# Patient Record
Sex: FEMALE | Race: WHITE | NOT HISPANIC OR LATINO
[De-identification: names, ages, dates, MRNs, and addresses within clinical notes are randomized per-mention and may not be internally consistent; named-entity substitution may affect disease eponyms.]

---

## 2018-06-23 ENCOUNTER — TRANSCRIPTION ENCOUNTER (OUTPATIENT)
Age: 63
End: 2018-06-23

## 2018-06-29 ENCOUNTER — TRANSCRIPTION ENCOUNTER (OUTPATIENT)
Age: 63
End: 2018-06-29

## 2018-07-03 ENCOUNTER — RX RENEWAL (OUTPATIENT)
Age: 63
End: 2018-07-03

## 2018-07-05 ENCOUNTER — RX RENEWAL (OUTPATIENT)
Age: 63
End: 2018-07-05

## 2018-07-05 DIAGNOSIS — Z86.39 PERSONAL HISTORY OF OTHER ENDOCRINE, NUTRITIONAL AND METABOLIC DISEASE: ICD-10-CM

## 2018-07-06 ENCOUNTER — RECORD ABSTRACTING (OUTPATIENT)
Age: 63
End: 2018-07-06

## 2018-07-06 ENCOUNTER — APPOINTMENT (OUTPATIENT)
Dept: INTERNAL MEDICINE | Facility: CLINIC | Age: 63
End: 2018-07-06
Payer: COMMERCIAL

## 2018-07-06 VITALS
SYSTOLIC BLOOD PRESSURE: 106 MMHG | OXYGEN SATURATION: 99 % | TEMPERATURE: 97.7 F | HEART RATE: 80 BPM | DIASTOLIC BLOOD PRESSURE: 70 MMHG

## 2018-07-06 VITALS — WEIGHT: 176 LBS | BODY MASS INDEX: 29.32 KG/M2 | HEIGHT: 65 IN

## 2018-07-06 DIAGNOSIS — Z83.3 FAMILY HISTORY OF DIABETES MELLITUS: ICD-10-CM

## 2018-07-06 DIAGNOSIS — S83.209A UNSPECIFIED TEAR OF UNSPECIFIED MENISCUS, CURRENT INJURY, UNSPECIFIED KNEE, INITIAL ENCOUNTER: ICD-10-CM

## 2018-07-06 DIAGNOSIS — K57.90 DIVERTICULOSIS OF INTESTINE, PART UNSPECIFIED, W/OUT PERFORATION OR ABSCESS W/OUT BLEEDING: ICD-10-CM

## 2018-07-06 DIAGNOSIS — Z82.49 FAMILY HISTORY OF ISCHEMIC HEART DISEASE AND OTHER DISEASES OF THE CIRCULATORY SYSTEM: ICD-10-CM

## 2018-07-06 DIAGNOSIS — Z78.9 OTHER SPECIFIED HEALTH STATUS: ICD-10-CM

## 2018-07-06 DIAGNOSIS — Z87.42 PERSONAL HISTORY OF OTHER DISEASES OF THE FEMALE GENITAL TRACT: ICD-10-CM

## 2018-07-06 DIAGNOSIS — R92.2 INCONCLUSIVE MAMMOGRAM: ICD-10-CM

## 2018-07-06 DIAGNOSIS — Z83.49 FAMILY HISTORY OF OTHER ENDOCRINE, NUTRITIONAL AND METABOLIC DISEASES: ICD-10-CM

## 2018-07-06 PROCEDURE — 99214 OFFICE O/P EST MOD 30 MIN: CPT

## 2018-07-06 RX ORDER — ADHESIVE TAPE 3"X 2.3 YD
50 MCG TAPE, NON-MEDICATED TOPICAL DAILY
Refills: 0 | Status: ACTIVE | COMMUNITY
Start: 2018-07-06

## 2018-07-06 RX ORDER — AMOXICILLIN 875 MG/1
875 TABLET, FILM COATED ORAL
Qty: 20 | Refills: 0 | Status: COMPLETED | COMMUNITY
Start: 2018-06-23

## 2018-07-06 NOTE — PHYSICAL EXAM
[No Acute Distress] : no acute distress [Well Nourished] : well nourished [Normal Sclera/Conjunctiva] : normal sclera/conjunctiva [PERRL] : pupils equal round and reactive to light [EOMI] : extraocular movements intact [Normal Outer Ear/Nose] : the outer ears and nose were normal in appearance [Normal Oropharynx] : the oropharynx was normal [Normal TMs] : both tympanic membranes were normal [No JVD] : no jugular venous distention [Supple] : supple [No Lymphadenopathy] : no lymphadenopathy [No Respiratory Distress] : no respiratory distress  [Clear to Auscultation] : lungs were clear to auscultation bilaterally [No Accessory Muscle Use] : no accessory muscle use [Normal Rate] : normal rate  [Regular Rhythm] : with a regular rhythm [Normal S1, S2] : normal S1 and S2 [Normal Posterior Cervical Nodes] : no posterior cervical lymphadenopathy [Normal Anterior Cervical Nodes] : no anterior cervical lymphadenopathy

## 2018-07-06 NOTE — PLAN
[FreeTextEntry1] : Patient will start medication given above and follow up with me in 5 days if not feeling better or before that if worsens.\par She is advised to increase fluids avoid dairy products and rest

## 2018-07-06 NOTE — REVIEW OF SYSTEMS
[Shortness Of Breath] : no shortness of breath [Wheezing] : no wheezing [Cough] : cough [Dyspnea on Exertion] : no dyspnea on exertion [Negative] : Heme/Lymph

## 2018-07-18 ENCOUNTER — RX RENEWAL (OUTPATIENT)
Age: 63
End: 2018-07-18

## 2018-08-03 ENCOUNTER — RX CHANGE (OUTPATIENT)
Age: 63
End: 2018-08-03

## 2018-08-06 ENCOUNTER — OTHER (OUTPATIENT)
Age: 63
End: 2018-08-06

## 2018-09-04 ENCOUNTER — RX RENEWAL (OUTPATIENT)
Age: 63
End: 2018-09-04

## 2018-09-04 RX ORDER — PHENTERMINE HYDROCHLORIDE 37.5 MG/1
37.5 CAPSULE ORAL DAILY
Qty: 30 | Refills: 0 | Status: COMPLETED | COMMUNITY
Start: 2018-07-03 | End: 2018-09-04

## 2018-10-02 ENCOUNTER — RX RENEWAL (OUTPATIENT)
Age: 63
End: 2018-10-02

## 2018-10-26 ENCOUNTER — RX RENEWAL (OUTPATIENT)
Age: 63
End: 2018-10-26

## 2018-10-29 ENCOUNTER — RX RENEWAL (OUTPATIENT)
Age: 63
End: 2018-10-29

## 2018-10-30 ENCOUNTER — RX RENEWAL (OUTPATIENT)
Age: 63
End: 2018-10-30

## 2018-11-07 ENCOUNTER — RECORD ABSTRACTING (OUTPATIENT)
Age: 63
End: 2018-11-07

## 2019-01-04 ENCOUNTER — RX RENEWAL (OUTPATIENT)
Age: 64
End: 2019-01-04

## 2019-01-24 ENCOUNTER — RX RENEWAL (OUTPATIENT)
Age: 64
End: 2019-01-24

## 2019-01-24 DIAGNOSIS — Z12.31 ENCOUNTER FOR SCREENING MAMMOGRAM FOR MALIGNANT NEOPLASM OF BREAST: ICD-10-CM

## 2019-01-25 PROBLEM — Z12.31 SCREENING FOR BREAST CANCER: Status: ACTIVE | Noted: 2019-01-25

## 2019-02-03 NOTE — HISTORY OF PRESENT ILLNESS
Statement Selected [FreeTextEntry8] : Pt c/o bad cough, and phlegm x 3 weeks. Pt went to UC 2x and was given steroid pack however meds are not helping.

## 2019-02-12 ENCOUNTER — RX RENEWAL (OUTPATIENT)
Age: 64
End: 2019-02-12

## 2019-02-25 ENCOUNTER — APPOINTMENT (OUTPATIENT)
Dept: INTERNAL MEDICINE | Facility: CLINIC | Age: 64
End: 2019-02-25
Payer: COMMERCIAL

## 2019-02-25 VITALS
BODY MASS INDEX: 30.16 KG/M2 | SYSTOLIC BLOOD PRESSURE: 120 MMHG | WEIGHT: 181 LBS | DIASTOLIC BLOOD PRESSURE: 70 MMHG | HEIGHT: 65 IN | TEMPERATURE: 97.7 F

## 2019-02-25 PROCEDURE — 99214 OFFICE O/P EST MOD 30 MIN: CPT

## 2019-02-25 RX ORDER — GUAIFENESIN AND DEXTROMETHORPHAN HYDROBROMIDE 1200; 60 MG/1; MG/1
60-1200 TABLET, EXTENDED RELEASE ORAL
Qty: 30 | Refills: 0 | Status: COMPLETED | COMMUNITY
Start: 2018-07-06 | End: 2019-02-25

## 2019-02-25 RX ORDER — PHENTERMINE HYDROCHLORIDE 15 MG/1
15 CAPSULE ORAL
Qty: 30 | Refills: 0 | Status: COMPLETED | COMMUNITY
Start: 2018-10-30 | End: 2019-02-25

## 2019-02-25 RX ORDER — BACILLUS COAGULANS/INULIN 1B-250 MG
CAPSULE ORAL
Refills: 0 | Status: ACTIVE | COMMUNITY

## 2019-02-25 RX ORDER — POTASSIUM CHLORIDE 750 MG/1
10 TABLET, EXTENDED RELEASE ORAL
Refills: 0 | Status: COMPLETED | COMMUNITY
Start: 2018-07-06 | End: 2019-02-25

## 2019-02-25 RX ORDER — MULTIVIT-MIN/IRON/FOLIC ACID/K 18-600-40
CAPSULE ORAL
Refills: 0 | Status: ACTIVE | COMMUNITY

## 2019-02-25 RX ORDER — SCOPALAMINE 1 MG/3D
1 PATCH, EXTENDED RELEASE TRANSDERMAL
Qty: 4 | Refills: 0 | Status: COMPLETED | COMMUNITY
Start: 2018-03-13 | End: 2019-02-25

## 2019-02-25 RX ORDER — ASPIRIN 81 MG
81 TABLET, DELAYED RELEASE (ENTERIC COATED) ORAL
Refills: 0 | Status: ACTIVE | COMMUNITY

## 2019-02-25 RX ORDER — UBIDECARENONE 200 MG
200 CAPSULE ORAL
Refills: 0 | Status: ACTIVE | COMMUNITY

## 2019-02-25 RX ORDER — TOPIRAMATE 25 MG/1
25 TABLET, FILM COATED ORAL DAILY
Refills: 0 | Status: COMPLETED | COMMUNITY
Start: 2018-07-06 | End: 2019-02-25

## 2019-02-25 RX ORDER — HYDROCHLOROTHIAZIDE 12.5 MG/1
12.5 CAPSULE ORAL DAILY
Refills: 0 | Status: COMPLETED | COMMUNITY
Start: 2018-07-06 | End: 2019-02-25

## 2019-02-25 RX ORDER — FLUTICASONE PROPIONATE AND SALMETEROL 50; 250 UG/1; UG/1
250-50 POWDER RESPIRATORY (INHALATION) TWICE DAILY
Qty: 60 | Refills: 0 | Status: COMPLETED | COMMUNITY
Start: 2018-07-06 | End: 2019-02-25

## 2019-02-25 RX ORDER — PHENTERMINE HYDROCHLORIDE 37.5 MG/1
37.5 TABLET ORAL DAILY
Qty: 30 | Refills: 1 | Status: COMPLETED | COMMUNITY
Start: 2018-07-05 | End: 2019-02-25

## 2019-02-25 NOTE — PLAN
[FreeTextEntry1] : Pt will continue with healthy diet\par She will try to still do exercises that do not involve her knee.\par She has appointment for mammo 3/26/19\par Right knee replacement 3/29/19\par Reviewed FBW in depth with pt BW is doing well\par Follow up again in 4 months

## 2019-02-25 NOTE — PHYSICAL EXAM
[No Acute Distress] : no acute distress [Well Nourished] : well nourished [Normal Sclera/Conjunctiva] : normal sclera/conjunctiva [PERRL] : pupils equal round and reactive to light [EOMI] : extraocular movements intact [Normal Outer Ear/Nose] : the outer ears and nose were normal in appearance [Normal Oropharynx] : the oropharynx was normal [Normal TMs] : both tympanic membranes were normal [No JVD] : no jugular venous distention [Supple] : supple [No Lymphadenopathy] : no lymphadenopathy [No Respiratory Distress] : no respiratory distress  [Clear to Auscultation] : lungs were clear to auscultation bilaterally [No Accessory Muscle Use] : no accessory muscle use [Normal Rate] : normal rate  [Regular Rhythm] : with a regular rhythm [Normal S1, S2] : normal S1 and S2 [Normal Anterior Cervical Nodes] : no anterior cervical lymphadenopathy [Normal Affect] : the affect was normal [Normal Insight/Judgement] : insight and judgment were intact

## 2019-02-25 NOTE — HISTORY OF PRESENT ILLNESS
[FreeTextEntry1] : 62 y/o female present for f/u visit and med renewal.  [de-identified] : Pt presents to the office today for follow up and review of FBW\par She has been feeling well overall.  She will be having right knee replacement on 3/29/19\par Since chronic knee pain she has not been able to exercise and has gained 5lbs.  Diet is still doing well and healthy\par She has appointment for her Mammo 3/26/19 .\par

## 2019-03-01 ENCOUNTER — RX RENEWAL (OUTPATIENT)
Age: 64
End: 2019-03-01

## 2019-03-11 ENCOUNTER — OUTPATIENT (OUTPATIENT)
Dept: OUTPATIENT SERVICES | Facility: HOSPITAL | Age: 64
LOS: 1 days | End: 2019-03-11
Payer: COMMERCIAL

## 2019-03-11 PROCEDURE — 93010 ELECTROCARDIOGRAM REPORT: CPT

## 2019-03-21 ENCOUNTER — APPOINTMENT (OUTPATIENT)
Dept: INTERNAL MEDICINE | Facility: CLINIC | Age: 64
End: 2019-03-21
Payer: COMMERCIAL

## 2019-03-21 VITALS
SYSTOLIC BLOOD PRESSURE: 120 MMHG | DIASTOLIC BLOOD PRESSURE: 80 MMHG | WEIGHT: 187 LBS | HEIGHT: 65 IN | OXYGEN SATURATION: 97 % | BODY MASS INDEX: 31.16 KG/M2 | HEART RATE: 65 BPM | TEMPERATURE: 98 F

## 2019-03-21 DIAGNOSIS — J98.01 ACUTE BRONCHOSPASM: ICD-10-CM

## 2019-03-21 PROCEDURE — 99214 OFFICE O/P EST MOD 30 MIN: CPT

## 2019-03-21 RX ORDER — LEVOCETIRIZINE DIHYDROCHLORIDE 5 MG/1
5 TABLET ORAL
Qty: 30 | Refills: 1 | Status: DISCONTINUED | COMMUNITY
Start: 2018-07-06 | End: 2019-03-21

## 2019-03-21 RX ORDER — CHOLECALCIFEROL (VITAMIN D3) 50 MCG
50 MCG TABLET ORAL
Refills: 0 | Status: DISCONTINUED | COMMUNITY
End: 2019-03-21

## 2019-03-21 NOTE — ASSESSMENT
[As per surgery] : as per surgery [Patient Optimized for Surgery] : Patient optimized for surgery [No Further Testing Recommended] : no further testing recommended [FreeTextEntry7] : ASA and supplements on hold.

## 2019-03-21 NOTE — RESULTS/DATA
[] : results reviewed [de-identified] : WNL [de-identified] : WNL [de-identified] : NSR 60 bpm [de-identified] : INR 1.0 [de-identified] : U/A neg\par HbA1c 5.3

## 2019-03-21 NOTE — HISTORY OF PRESENT ILLNESS
[No Pertinent Cardiac History] : no history of aortic stenosis, atrial fibrillation, coronary artery disease, recent myocardial infarction, or implantable device/pacemaker [No Pertinent Pulmonary History] : no history of asthma, COPD, sleep apnea, or smoking [No Adverse Anesthesia Reaction] : no adverse anesthesia reaction in self or family member [Chronic Anticoagulation] : no chronic anticoagulation [Chronic Kidney Disease] : no chronic kidney disease [Diabetes] : no diabetes [(Patient denies any chest pain, claudication, dyspnea on exertion, orthopnea, palpitations or syncope)] : Patient denies any chest pain, claudication, dyspnea on exertion, orthopnea, palpitations or syncope [FreeTextEntry1] : Right knee replacement [FreeTextEntry2] : 3/29/19 [FreeTextEntry3] : Dr Salvador Gutierres [FreeTextEntry4] : 62 y/o female with h/o HTN who is planned for right knee replacement.

## 2019-03-26 ENCOUNTER — RX RENEWAL (OUTPATIENT)
Age: 64
End: 2019-03-26

## 2019-03-29 ENCOUNTER — RX RENEWAL (OUTPATIENT)
Age: 64
End: 2019-03-29

## 2019-03-29 ENCOUNTER — INPATIENT (INPATIENT)
Facility: HOSPITAL | Age: 64
LOS: 2 days | Discharge: HOME | End: 2019-04-01
Payer: COMMERCIAL

## 2019-03-29 PROCEDURE — 73560 X-RAY EXAM OF KNEE 1 OR 2: CPT | Mod: 26,RT

## 2019-03-30 ENCOUNTER — OUTPATIENT (OUTPATIENT)
Dept: OUTPATIENT SERVICES | Facility: HOSPITAL | Age: 64
LOS: 1 days | End: 2019-03-30

## 2019-03-31 ENCOUNTER — OUTPATIENT (OUTPATIENT)
Dept: OUTPATIENT SERVICES | Facility: HOSPITAL | Age: 64
LOS: 1 days | End: 2019-03-31

## 2019-04-01 ENCOUNTER — OUTPATIENT (OUTPATIENT)
Dept: OUTPATIENT SERVICES | Facility: HOSPITAL | Age: 64
LOS: 1 days | End: 2019-04-01

## 2019-04-24 ENCOUNTER — RESULT REVIEW (OUTPATIENT)
Age: 64
End: 2019-04-24

## 2019-04-29 ENCOUNTER — APPOINTMENT (OUTPATIENT)
Dept: INTERNAL MEDICINE | Facility: CLINIC | Age: 64
End: 2019-04-29
Payer: COMMERCIAL

## 2019-04-29 VITALS
TEMPERATURE: 97.9 F | SYSTOLIC BLOOD PRESSURE: 116 MMHG | WEIGHT: 188 LBS | DIASTOLIC BLOOD PRESSURE: 80 MMHG | BODY MASS INDEX: 31.32 KG/M2 | HEIGHT: 65 IN

## 2019-04-29 PROCEDURE — 99213 OFFICE O/P EST LOW 20 MIN: CPT | Mod: 25

## 2019-04-29 RX ORDER — LORCASERIN HYDROCHLORIDE HEMIHYDRATE 20 MG/1
20 TABLET, FILM COATED, EXTENDED RELEASE ORAL
Qty: 30 | Refills: 3 | Status: DISCONTINUED | COMMUNITY
Start: 2018-07-06 | End: 2019-04-29

## 2019-04-29 NOTE — PHYSICAL EXAM
[No Acute Distress] : no acute distress [Well Nourished] : well nourished [Normal Rate] : normal rate  [Regular Rhythm] : with a regular rhythm [Normal S1, S2] : normal S1 and S2 [Normal Gait] : normal gait [Normal Affect] : the affect was normal [Normal Insight/Judgement] : insight and judgment were intact [de-identified] : Surgical site of right knee healed well

## 2019-04-29 NOTE — PLAN
[FreeTextEntry1] : Pt will restart Phentermine for the next few months to further help with weight loss since inactivity prior and after having knee replacement done.\par She will continue with her routine follow ups\par She is aware of medication and side effects that may occur as she has been on this medication in the past \par Follow up in 1 months

## 2019-04-29 NOTE — HISTORY OF PRESENT ILLNESS
[FreeTextEntry1] : 65 y/o female present for f/u visit  [de-identified] : Pt presents to the office today S/P right knee surgery.  She is doing very well since surgery and continue to go to PT routinely and is currently at 114 deg bend with her knee already.\par She has been upset about the weight she gained back.   Now that she is able to start moving around more and starting exercise she wanted to restart Phentermine for a few months to loss the weight that she gained back from the lack of activity \par She has been taking the Belviqu but with little improvement with weight loss

## 2019-06-18 ENCOUNTER — RX RENEWAL (OUTPATIENT)
Age: 64
End: 2019-06-18

## 2019-06-27 ENCOUNTER — RX RENEWAL (OUTPATIENT)
Age: 64
End: 2019-06-27

## 2019-07-18 ENCOUNTER — APPOINTMENT (OUTPATIENT)
Dept: INTERNAL MEDICINE | Facility: CLINIC | Age: 64
End: 2019-07-18
Payer: COMMERCIAL

## 2019-07-18 VITALS
SYSTOLIC BLOOD PRESSURE: 120 MMHG | DIASTOLIC BLOOD PRESSURE: 80 MMHG | TEMPERATURE: 98.6 F | WEIGHT: 188 LBS | HEIGHT: 65 IN | BODY MASS INDEX: 31.32 KG/M2

## 2019-07-18 DIAGNOSIS — M25.50 PAIN IN UNSPECIFIED JOINT: ICD-10-CM

## 2019-07-18 PROCEDURE — 99214 OFFICE O/P EST MOD 30 MIN: CPT

## 2019-07-18 RX ORDER — PHENTERMINE HYDROCHLORIDE 37.5 MG/1
37.5 TABLET ORAL
Qty: 30 | Refills: 2 | Status: COMPLETED | COMMUNITY
Start: 2019-04-29 | End: 2019-07-18

## 2019-07-18 NOTE — HISTORY OF PRESENT ILLNESS
[FreeTextEntry1] : Weight gain [de-identified] : Patient presents to the office today for followup weight check and to discuss medication.\par She has been on phentermine in the past with brakes and most recently back on medication with no improvement and weight loss\par Throughout the year she has been doing very well with weight loss diet and exercise but has now  hit a plateau

## 2019-07-18 NOTE — PLAN
[FreeTextEntry1] : Patient still gets intermittent joint pains and uses ibuprofen as needed and will be given for refill Rx\par She will see weight management for further treatment and help with weight loss when she returns from vacation and able to get an appointment with them. Until then she will try a trial of contrave to see if this further helps weight loss\par Discuss motion sickness as patient will be going on a cruise and she will try scopolamine patch to help with motion sickness

## 2019-07-24 ENCOUNTER — CLINICAL ADVICE (OUTPATIENT)
Age: 64
End: 2019-07-24

## 2019-08-16 ENCOUNTER — RX RENEWAL (OUTPATIENT)
Age: 64
End: 2019-08-16

## 2019-09-23 ENCOUNTER — RX RENEWAL (OUTPATIENT)
Age: 64
End: 2019-09-23

## 2019-10-29 ENCOUNTER — APPOINTMENT (OUTPATIENT)
Dept: INTERNAL MEDICINE | Facility: CLINIC | Age: 64
End: 2019-10-29
Payer: COMMERCIAL

## 2019-10-29 VITALS
DIASTOLIC BLOOD PRESSURE: 80 MMHG | HEART RATE: 84 BPM | TEMPERATURE: 97.6 F | WEIGHT: 185 LBS | OXYGEN SATURATION: 97 % | BODY MASS INDEX: 29.73 KG/M2 | HEIGHT: 66 IN | SYSTOLIC BLOOD PRESSURE: 120 MMHG

## 2019-10-29 PROCEDURE — 99214 OFFICE O/P EST MOD 30 MIN: CPT

## 2019-10-29 NOTE — PHYSICAL EXAM
[de-identified] : small freely movable boggy mass midline periumbilical with surrounding bruising

## 2019-10-29 NOTE — HISTORY OF PRESENT ILLNESS
[FreeTextEntry8] : 63 y/o female present with a lump and a bruise on stomach.\par She noticed yesterday and was the size of a quarter.  Has been improving since yesterday \par Doesn't recall any trauma to the area \par Feels well otherwise

## 2019-12-02 DIAGNOSIS — R19.00 INTRA-ABDOMINAL AND PELVIC SWELLING, MASS AND LUMP, UNSPECIFIED SITE: ICD-10-CM

## 2019-12-13 RX ORDER — NALTREXONE HYDROCHLORIDE AND BUPROPION HYDROCHLORIDE 8; 90 MG/1; MG/1
8-90 TABLET, EXTENDED RELEASE ORAL
Qty: 120 | Refills: 0 | Status: COMPLETED | COMMUNITY
Start: 2019-07-18 | End: 2019-12-13

## 2019-12-17 ENCOUNTER — RX RENEWAL (OUTPATIENT)
Age: 64
End: 2019-12-17

## 2020-02-01 ENCOUNTER — TRANSCRIPTION ENCOUNTER (OUTPATIENT)
Age: 65
End: 2020-02-01

## 2020-02-10 ENCOUNTER — APPOINTMENT (OUTPATIENT)
Dept: INTERNAL MEDICINE | Facility: CLINIC | Age: 65
End: 2020-02-10
Payer: COMMERCIAL

## 2020-02-10 VITALS
HEART RATE: 90 BPM | HEIGHT: 64 IN | BODY MASS INDEX: 31.07 KG/M2 | DIASTOLIC BLOOD PRESSURE: 80 MMHG | SYSTOLIC BLOOD PRESSURE: 110 MMHG | OXYGEN SATURATION: 96 % | TEMPERATURE: 98.4 F | WEIGHT: 182 LBS

## 2020-02-10 LAB — S PYO AG SPEC QL IA: NEGATIVE

## 2020-02-10 PROCEDURE — 99214 OFFICE O/P EST MOD 30 MIN: CPT | Mod: 25

## 2020-02-10 PROCEDURE — 87880 STREP A ASSAY W/OPTIC: CPT | Mod: QW

## 2020-02-10 RX ORDER — FLUTICASONE PROPIONATE 50 UG/1
50 SPRAY, METERED NASAL
Qty: 16 | Refills: 0 | Status: COMPLETED | COMMUNITY
Start: 2020-02-01

## 2020-02-10 RX ORDER — AMOXICILLIN 500 MG/1
500 CAPSULE ORAL
Qty: 16 | Refills: 0 | Status: COMPLETED | COMMUNITY
Start: 2019-11-22

## 2020-02-10 RX ORDER — OLOPATADINE HCL 1 MG/ML
0.1 SOLUTION/ DROPS OPHTHALMIC
Qty: 5 | Refills: 0 | Status: ACTIVE | COMMUNITY
Start: 2020-02-01

## 2020-02-10 NOTE — PLAN
[FreeTextEntry1] : Pt will continue rx opthalmic drops given by UC\par Her RST is negative and sore throat appears to be secondary to her reflux. She has appointment with GI Monday so no treatment at this time

## 2020-02-10 NOTE — HISTORY OF PRESENT ILLNESS
[FreeTextEntry8] : 65 y/o female present with a sore throat, congested and conjunctivitis.\par She was seen at  and given eye drops with improvement\par Sore throat has been continuing

## 2020-02-10 NOTE — PHYSICAL EXAM
[Normal] : no posterior cervical lymphadenopathy and no anterior cervical lymphadenopathy [de-identified] : oropharynx erythema

## 2020-03-23 ENCOUNTER — RX RENEWAL (OUTPATIENT)
Age: 65
End: 2020-03-23

## 2020-05-14 ENCOUNTER — RX RENEWAL (OUTPATIENT)
Age: 65
End: 2020-05-14

## 2020-06-22 ENCOUNTER — RX RENEWAL (OUTPATIENT)
Age: 65
End: 2020-06-22

## 2020-07-17 ENCOUNTER — APPOINTMENT (OUTPATIENT)
Dept: INTERNAL MEDICINE | Facility: CLINIC | Age: 65
End: 2020-07-17
Payer: MEDICARE

## 2020-07-17 VITALS
DIASTOLIC BLOOD PRESSURE: 70 MMHG | HEART RATE: 72 BPM | HEIGHT: 64 IN | OXYGEN SATURATION: 90 % | TEMPERATURE: 98.2 F | BODY MASS INDEX: 30.22 KG/M2 | SYSTOLIC BLOOD PRESSURE: 120 MMHG | WEIGHT: 177 LBS

## 2020-07-17 DIAGNOSIS — R92.2 INCONCLUSIVE MAMMOGRAM: ICD-10-CM

## 2020-07-17 PROCEDURE — 99214 OFFICE O/P EST MOD 30 MIN: CPT

## 2020-07-17 RX ORDER — LIRAGLUTIDE 6 MG/ML
18 INJECTION, SOLUTION SUBCUTANEOUS
Qty: 15 | Refills: 0 | Status: COMPLETED | COMMUNITY
Start: 2020-02-04 | End: 2020-07-17

## 2020-07-17 RX ORDER — SCOPOLAMINE 1.5 MG/1
1 PATCH, EXTENDED RELEASE TRANSDERMAL
Qty: 1 | Refills: 0 | Status: COMPLETED | COMMUNITY
Start: 2019-07-18 | End: 2020-07-17

## 2020-07-17 RX ORDER — ALPRAZOLAM 0.25 MG/1
0.25 TABLET ORAL
Qty: 30 | Refills: 0 | Status: ACTIVE | COMMUNITY
Start: 2018-07-06 | End: 1900-01-01

## 2020-07-20 NOTE — HISTORY OF PRESENT ILLNESS
[FreeTextEntry8] : 66 y/o female present with acid reflux and a f/u visit. \par She wanted to discuss her current treatment plan she is undergoing with her GI Dr.  She has been with increawsed reflux for the last few months.  She started AcipHex 20mg BID for the last 2 weeks with little improvement.  She went for CT of ABD and as per pt unremarkable.\par She is taking Aciphex BID WITH food.

## 2020-07-20 NOTE — PLAN
[FreeTextEntry1] : Discussed PPI medications with pt.  Discussed they work better on empty stomach 30-45 min before eating something.\par Pt will start taking medication on empty stomach and follow up with her GI.  She may need endoscopy as well.  Her GI is retiring and when reading she will call my office and make appointment with Dr Justice for consult.\par Refill of Xanax for her anxiety as she does well with medication PRN\par I will refill Ibuprofen for joint pains as she uses intermittent but advised pt not to take this medication until her GI issues have resolved

## 2020-07-20 NOTE — PHYSICAL EXAM
[Normal TMs] : both tympanic membranes were normal [Normal] : affect was normal and insight and judgment were intact

## 2020-08-05 ENCOUNTER — RX RENEWAL (OUTPATIENT)
Age: 65
End: 2020-08-05

## 2020-08-12 ENCOUNTER — RX RENEWAL (OUTPATIENT)
Age: 65
End: 2020-08-12

## 2020-08-21 ENCOUNTER — APPOINTMENT (OUTPATIENT)
Dept: GASTROENTEROLOGY | Facility: CLINIC | Age: 65
End: 2020-08-21

## 2020-08-24 ENCOUNTER — APPOINTMENT (OUTPATIENT)
Dept: INTERNAL MEDICINE | Facility: CLINIC | Age: 65
End: 2020-08-24
Payer: MEDICARE

## 2020-08-24 ENCOUNTER — OUTPATIENT (OUTPATIENT)
Dept: OUTPATIENT SERVICES | Facility: HOSPITAL | Age: 65
LOS: 1 days | End: 2020-08-24
Payer: MEDICARE

## 2020-08-24 VITALS
TEMPERATURE: 97.1 F | BODY MASS INDEX: 30.39 KG/M2 | SYSTOLIC BLOOD PRESSURE: 112 MMHG | DIASTOLIC BLOOD PRESSURE: 80 MMHG | WEIGHT: 178 LBS | HEIGHT: 64 IN

## 2020-08-24 PROCEDURE — 93010 ELECTROCARDIOGRAM REPORT: CPT

## 2020-08-24 PROCEDURE — 99214 OFFICE O/P EST MOD 30 MIN: CPT

## 2020-08-24 NOTE — PLAN
[FreeTextEntry1] : Pt will continue with current PPI treatment by GI\par She will await her bx results\par She will discuss with GI possibly adding short term Carafate to further help with her symptoms\par

## 2020-08-24 NOTE — HISTORY OF PRESENT ILLNESS
[FreeTextEntry1] : 66 y/o female present for f/u visit  [de-identified] : Pt presents to the office today for follow up as she was seen by GI and wanted to go over her reflux and BW\par She had elevated ferritin level normal CBC\par She has improvement with her reflux and vomiting but still occurring a few times a week vs daily\par She had endoscopy done and waiting on bx results

## 2020-08-31 ENCOUNTER — RX CHANGE (OUTPATIENT)
Age: 65
End: 2020-08-31

## 2020-08-31 RX ORDER — ROSUVASTATIN CALCIUM 20 MG/1
20 TABLET, FILM COATED ORAL
Qty: 30 | Refills: 0 | Status: DISCONTINUED | COMMUNITY
Start: 2018-07-06 | End: 2020-08-31

## 2020-09-08 ENCOUNTER — APPOINTMENT (OUTPATIENT)
Dept: INTERNAL MEDICINE | Facility: CLINIC | Age: 65
End: 2020-09-08
Payer: MEDICARE

## 2020-09-08 VITALS
TEMPERATURE: 98 F | HEIGHT: 64 IN | SYSTOLIC BLOOD PRESSURE: 100 MMHG | HEART RATE: 79 BPM | DIASTOLIC BLOOD PRESSURE: 66 MMHG | OXYGEN SATURATION: 97 % | BODY MASS INDEX: 30.05 KG/M2 | WEIGHT: 176 LBS

## 2020-09-08 DIAGNOSIS — M16.11 UNILATERAL PRIMARY OSTEOARTHRITIS, RIGHT HIP: ICD-10-CM

## 2020-09-08 PROCEDURE — 99214 OFFICE O/P EST MOD 30 MIN: CPT

## 2020-09-08 RX ORDER — RABEPRAZOLE SODIUM 20 MG/1
20 TABLET, DELAYED RELEASE ORAL
Refills: 0 | Status: DISCONTINUED | COMMUNITY
End: 2020-09-08

## 2020-09-08 NOTE — REVIEW OF SYSTEMS
[Negative] : Constitutional [Joint Pain] : joint pain [Heartburn] : no heartburn [FreeTextEntry9] : right hip pain

## 2020-09-08 NOTE — HISTORY OF PRESENT ILLNESS
[No Pertinent Cardiac History] : no history of aortic stenosis, atrial fibrillation, coronary artery disease, recent myocardial infarction, or implantable device/pacemaker [No Pertinent Pulmonary History] : no history of asthma, COPD, sleep apnea, or smoking [No Adverse Anesthesia Reaction] : no adverse anesthesia reaction in self or family member [(Patient denies any chest pain, claudication, dyspnea on exertion, orthopnea, palpitations or syncope)] : Patient denies any chest pain, claudication, dyspnea on exertion, orthopnea, palpitations or syncope [Excellent (>10 METs)] : Excellent (>10 METs) [Anti-Platelet Agents: _____] : Anti-Platelet Agents: [unfilled] [Chronic Anticoagulation] : no chronic anticoagulation [Chronic Kidney Disease] : no chronic kidney disease [Diabetes] : no diabetes [FreeTextEntry1] : Right hip replacement [FreeTextEntry2] : 9/14/20 [FreeTextEntry3] : Dr. Salvador Gutierres at [facility]. Fax: [phone], [phone] [FreeTextEntry7] : EKG

## 2020-09-08 NOTE — PHYSICAL EXAM
[Normal] : no respiratory distress, lungs were clear to auscultation bilaterally and no accessory muscle use [No Edema] : there was no peripheral edema [Soft] : abdomen soft [No Spinal Tenderness] : no spinal tenderness [Non Tender] : non-tender [No Joint Swelling] : no joint swelling [No Rash] : no rash [Grossly Normal Strength/Tone] : grossly normal strength/tone [Normal Affect] : the affect was normal [Normal Gait] : normal gait [Normal Mood] : the mood was normal

## 2020-09-08 NOTE — ASSESSMENT
[Patient Optimized for Surgery] : Patient optimized for surgery [No Further Testing Recommended] : no further testing recommended [As per surgery] : as per surgery [FreeTextEntry4] : Patient is a low cardiac risk for a low risk surgical procedure. No medical contraindications for planned surgery. Preop labs and EKG were normal. She will hold NSAID's / Aspirin and herbal supplements now and until date of surgery. She will take the Atenolol on morning of surgery with a sip of water.

## 2020-09-08 NOTE — RESULTS/DATA
[] : results reviewed [de-identified] : within normal limits  [de-identified] : within normal limits  [de-identified] : within normal limits  [de-identified] : AVILA @ 67, normal study  [de-identified] : UA normal

## 2020-09-11 ENCOUNTER — APPOINTMENT (OUTPATIENT)
Dept: DISASTER EMERGENCY | Facility: CLINIC | Age: 65
End: 2020-09-11

## 2020-09-11 DIAGNOSIS — Z01.818 ENCOUNTER FOR OTHER PREPROCEDURAL EXAMINATION: ICD-10-CM

## 2020-09-12 LAB — SARS-COV-2 N GENE NPH QL NAA+PROBE: NOT DETECTED

## 2020-09-14 ENCOUNTER — OUTPATIENT (OUTPATIENT)
Dept: OUTPATIENT SERVICES | Facility: HOSPITAL | Age: 65
LOS: 1 days | End: 2020-09-14

## 2020-09-14 ENCOUNTER — RX RENEWAL (OUTPATIENT)
Age: 65
End: 2020-09-14

## 2020-09-14 ENCOUNTER — INPATIENT (INPATIENT)
Facility: HOSPITAL | Age: 65
LOS: 1 days | Discharge: HOME | End: 2020-09-16
Payer: MEDICARE

## 2020-09-14 PROCEDURE — 73501 X-RAY EXAM HIP UNI 1 VIEW: CPT | Mod: 26,RT

## 2020-09-15 ENCOUNTER — OUTPATIENT (OUTPATIENT)
Dept: OUTPATIENT SERVICES | Facility: HOSPITAL | Age: 65
LOS: 1 days | End: 2020-09-15

## 2020-09-22 ENCOUNTER — RX CHANGE (OUTPATIENT)
Age: 65
End: 2020-09-22

## 2020-09-24 ENCOUNTER — RX CHANGE (OUTPATIENT)
Age: 65
End: 2020-09-24

## 2021-01-07 ENCOUNTER — TRANSCRIPTION ENCOUNTER (OUTPATIENT)
Age: 66
End: 2021-01-07

## 2021-01-07 ENCOUNTER — APPOINTMENT (OUTPATIENT)
Dept: INTERNAL MEDICINE | Facility: CLINIC | Age: 66
End: 2021-01-07
Payer: MEDICARE

## 2021-01-07 VITALS
SYSTOLIC BLOOD PRESSURE: 118 MMHG | RESPIRATION RATE: 16 BRPM | TEMPERATURE: 98.2 F | DIASTOLIC BLOOD PRESSURE: 72 MMHG | HEART RATE: 88 BPM | OXYGEN SATURATION: 95 %

## 2021-01-07 PROCEDURE — 99213 OFFICE O/P EST LOW 20 MIN: CPT | Mod: CS

## 2021-01-07 NOTE — HISTORY OF PRESENT ILLNESS
[FreeTextEntry8] : Pt presents to the office today with fatigue and a cough for the last 3 days \par No fever or SOB

## 2021-01-07 NOTE — PLAN
[FreeTextEntry1] : Pt is advised to quarantine until COVID PCR results done in office today are reviewed.\par She will rest take Robitussin DM Advil or Tylenol PRN \par

## 2021-01-07 NOTE — REVIEW OF SYSTEMS
[Fever] : no fever [Fatigue] : fatigue [Shortness Of Breath] : no shortness of breath [Wheezing] : no wheezing [Cough] : cough [Negative] : Cardiovascular

## 2021-01-11 LAB — SARS-COV-2 N GENE NPH QL NAA+PROBE: DETECTED

## 2021-01-19 ENCOUNTER — TRANSCRIPTION ENCOUNTER (OUTPATIENT)
Age: 66
End: 2021-01-19

## 2021-01-26 ENCOUNTER — TRANSCRIPTION ENCOUNTER (OUTPATIENT)
Age: 66
End: 2021-01-26

## 2021-03-03 ENCOUNTER — TRANSCRIPTION ENCOUNTER (OUTPATIENT)
Age: 66
End: 2021-03-03

## 2021-03-09 ENCOUNTER — RX RENEWAL (OUTPATIENT)
Age: 66
End: 2021-03-09

## 2021-04-08 ENCOUNTER — RX RENEWAL (OUTPATIENT)
Age: 66
End: 2021-04-08

## 2021-05-06 ENCOUNTER — RX CHANGE (OUTPATIENT)
Age: 66
End: 2021-05-06

## 2021-05-07 ENCOUNTER — RX RENEWAL (OUTPATIENT)
Age: 66
End: 2021-05-07

## 2021-05-24 ENCOUNTER — RX CHANGE (OUTPATIENT)
Age: 66
End: 2021-05-24

## 2021-06-04 ENCOUNTER — RX RENEWAL (OUTPATIENT)
Age: 66
End: 2021-06-04

## 2021-07-12 ENCOUNTER — RX CHANGE (OUTPATIENT)
Age: 66
End: 2021-07-12

## 2021-07-13 ENCOUNTER — RX RENEWAL (OUTPATIENT)
Age: 66
End: 2021-07-13

## 2021-07-20 ENCOUNTER — RX CHANGE (OUTPATIENT)
Age: 66
End: 2021-07-20

## 2021-08-03 ENCOUNTER — RX RENEWAL (OUTPATIENT)
Age: 66
End: 2021-08-03

## 2021-08-10 ENCOUNTER — APPOINTMENT (OUTPATIENT)
Dept: INTERNAL MEDICINE | Facility: CLINIC | Age: 66
End: 2021-08-10
Payer: MEDICARE

## 2021-08-10 ENCOUNTER — RX CHANGE (OUTPATIENT)
Age: 66
End: 2021-08-10

## 2021-08-10 VITALS
DIASTOLIC BLOOD PRESSURE: 78 MMHG | SYSTOLIC BLOOD PRESSURE: 100 MMHG | HEART RATE: 76 BPM | HEIGHT: 64 IN | OXYGEN SATURATION: 93 % | BODY MASS INDEX: 30.05 KG/M2 | WEIGHT: 176 LBS | TEMPERATURE: 97.8 F

## 2021-08-10 DIAGNOSIS — Z86.39 PERSONAL HISTORY OF OTHER ENDOCRINE, NUTRITIONAL AND METABOLIC DISEASE: ICD-10-CM

## 2021-08-10 PROCEDURE — 99214 OFFICE O/P EST MOD 30 MIN: CPT

## 2021-08-10 NOTE — HISTORY OF PRESENT ILLNESS
[FreeTextEntry1] : 65 y/o female presents to the office today for a f/u visit and to go over lab results, and med renewals.  [de-identified] : Pt presents to the office today for follow up and review of FBW\par She has been feeling well overall\par She suffers with hemorrhoids and doesn’t like suppositories she was given\par Intermittent constipation \par Vaginal dryness making appointment with her GYN

## 2021-08-11 ENCOUNTER — TRANSCRIPTION ENCOUNTER (OUTPATIENT)
Age: 66
End: 2021-08-11

## 2021-08-11 LAB
25(OH)D3 SERPL-MCNC: 72.3 NG/ML
ALBUMIN SERPL ELPH-MCNC: 4.1 G/DL
ALP BLD-CCNC: 121 U/L
ALT SERPL-CCNC: 10 U/L
ANION GAP SERPL CALC-SCNC: 10 MMOL/L
AST SERPL-CCNC: 15 U/L
BASOPHILS # BLD AUTO: 0.04 K/UL
BASOPHILS NFR BLD AUTO: 0.7 %
BILIRUB SERPL-MCNC: 0.4 MG/DL
BUN SERPL-MCNC: 11 MG/DL
CALCIUM SERPL-MCNC: 8.9 MG/DL
CHLORIDE SERPL-SCNC: 105 MMOL/L
CHOLEST SERPL-MCNC: 174 MG/DL
CO2 SERPL-SCNC: 26 MMOL/L
CREAT SERPL-MCNC: 0.71 MG/DL
EOSINOPHIL # BLD AUTO: 0.11 K/UL
EOSINOPHIL NFR BLD AUTO: 2 %
ESTIMATED AVERAGE GLUCOSE: 105 MG/DL
GLUCOSE SERPL-MCNC: 103 MG/DL
HBA1C MFR BLD HPLC: 5.3 %
HCT VFR BLD CALC: 40.8 %
HDLC SERPL-MCNC: 56 MG/DL
HGB BLD-MCNC: 13.5 G/DL
IMM GRANULOCYTES NFR BLD AUTO: 0.2 %
LDLC SERPL CALC-MCNC: 101 MG/DL
LYMPHOCYTES # BLD AUTO: 0.92 K/UL
LYMPHOCYTES NFR BLD AUTO: 16.3 %
MAN DIFF?: NORMAL
MCHC RBC-ENTMCNC: 30.1 PG
MCHC RBC-ENTMCNC: 33.1 GM/DL
MCV RBC AUTO: 90.9 FL
MONOCYTES # BLD AUTO: 0.44 K/UL
MONOCYTES NFR BLD AUTO: 7.8 %
NEUTROPHILS # BLD AUTO: 4.11 K/UL
NEUTROPHILS NFR BLD AUTO: 73 %
NONHDLC SERPL-MCNC: 117 MG/DL
PLATELET # BLD AUTO: 268 K/UL
POTASSIUM SERPL-SCNC: 4.5 MMOL/L
PROT SERPL-MCNC: 5.8 G/DL
RBC # BLD: 4.49 M/UL
RBC # FLD: 12.4 %
SODIUM SERPL-SCNC: 141 MMOL/L
TRIGL SERPL-MCNC: 82 MG/DL
WBC # FLD AUTO: 5.63 K/UL

## 2021-09-07 ENCOUNTER — TRANSCRIPTION ENCOUNTER (OUTPATIENT)
Age: 66
End: 2021-09-07

## 2021-09-08 ENCOUNTER — TRANSCRIPTION ENCOUNTER (OUTPATIENT)
Age: 66
End: 2021-09-08

## 2021-09-09 ENCOUNTER — APPOINTMENT (OUTPATIENT)
Dept: INTERNAL MEDICINE | Facility: CLINIC | Age: 66
End: 2021-09-09
Payer: MEDICARE

## 2021-09-09 VITALS
WEIGHT: 170 LBS | HEART RATE: 77 BPM | SYSTOLIC BLOOD PRESSURE: 100 MMHG | TEMPERATURE: 98.1 F | OXYGEN SATURATION: 93 % | DIASTOLIC BLOOD PRESSURE: 70 MMHG | BODY MASS INDEX: 29.02 KG/M2 | HEIGHT: 64 IN

## 2021-09-09 DIAGNOSIS — J02.9 ACUTE PHARYNGITIS, UNSPECIFIED: ICD-10-CM

## 2021-09-09 LAB — S PYO AG SPEC QL IA: NEGATIVE

## 2021-09-09 PROCEDURE — 99213 OFFICE O/P EST LOW 20 MIN: CPT | Mod: 25

## 2021-09-09 PROCEDURE — 87880 STREP A ASSAY W/OPTIC: CPT | Mod: QW

## 2021-09-09 NOTE — HISTORY OF PRESENT ILLNESS
[FreeTextEntry8] : 67 y/o female presents to the office today with a sore throat and cough.\par She has no fever or chills.  Sore throat are worse in the morning and at night better during the day.\par Using Tylenol 2-3 times a day

## 2021-09-09 NOTE — PLAN
[FreeTextEntry1] : RST negative\par Pt will continue Tylenol and start Robitusin DM for symptom control\par Viral resp panel sent out.  Pt will be advised of results once reviewed\par \par \par Pt will RTO when better for Prevnar vaccination

## 2021-09-09 NOTE — REVIEW OF SYSTEMS
[Sore Throat] : sore throat [Shortness Of Breath] : no shortness of breath [Wheezing] : no wheezing [Cough] : cough [Negative] : Cardiovascular

## 2021-09-13 ENCOUNTER — TRANSCRIPTION ENCOUNTER (OUTPATIENT)
Age: 66
End: 2021-09-13

## 2021-09-13 LAB
RAPID RVP RESULT: NOT DETECTED
SARS-COV-2 RNA PNL RESP NAA+PROBE: NOT DETECTED

## 2021-09-16 ENCOUNTER — APPOINTMENT (OUTPATIENT)
Dept: COLORECTAL SURGERY | Facility: CLINIC | Age: 66
End: 2021-09-16
Payer: MEDICARE

## 2021-09-16 PROCEDURE — 99203 OFFICE O/P NEW LOW 30 MIN: CPT

## 2021-09-16 PROCEDURE — 46600 DIAGNOSTIC ANOSCOPY SPX: CPT

## 2021-10-22 ENCOUNTER — TRANSCRIPTION ENCOUNTER (OUTPATIENT)
Age: 66
End: 2021-10-22

## 2021-10-25 ENCOUNTER — RX RENEWAL (OUTPATIENT)
Age: 66
End: 2021-10-25

## 2021-11-11 ENCOUNTER — APPOINTMENT (OUTPATIENT)
Dept: COLORECTAL SURGERY | Facility: CLINIC | Age: 66
End: 2021-11-11
Payer: MEDICARE

## 2021-11-11 VITALS
SYSTOLIC BLOOD PRESSURE: 121 MMHG | BODY MASS INDEX: 28.17 KG/M2 | DIASTOLIC BLOOD PRESSURE: 75 MMHG | WEIGHT: 165 LBS | HEART RATE: 91 BPM | HEIGHT: 64 IN

## 2021-11-11 PROCEDURE — 46600 DIAGNOSTIC ANOSCOPY SPX: CPT

## 2021-11-11 PROCEDURE — 99213 OFFICE O/P EST LOW 20 MIN: CPT | Mod: 25

## 2021-11-11 NOTE — REVIEW OF SYSTEMS
[Recent Weight Loss (___ Lbs)] : recent [unfilled] ~Ulb weight loss [As Noted in HPI] : as noted in HPI [Negative] : Heme/Lymph [FreeTextEntry2] : Intentional 90 pound weight loss over the past 4 years

## 2021-11-11 NOTE — CONSULT LETTER
[Dear  ___] : Dear  [unfilled], [Consult Letter:] : I had the pleasure of evaluating your patient, [unfilled]. [Please see my note below.] : Please see my note below. [Consult Closing:] : Thank you very much for allowing me to participate in the care of this patient.  If you have any questions, please do not hesitate to contact me. [Sincerely,] : Sincerely, [FreeTextEntry3] : Lino Ramirez MD\par

## 2021-11-11 NOTE — HISTORY OF PRESENT ILLNESS
[FreeTextEntry1] : 66-year-old female who presents for consultation for rectal bleeding.  She reports a week and a half of bright red blood per rectum.  There was some rectal discomfort associated with it but no overt pain.  The bleeding accompanied bowel movements and drips into the toilet.  She had a colonoscopy in March 2021 by Dr. Elisha Underwood which showed hemorrhoids and colon polyps.  She was treated with a course of hydrocortisone suppositories at the time but has never really had issues with hemorrhoids.  She denies abdominal pain, nausea or vomiting.  She did have some loose stools prior to onset of her symptoms.

## 2021-11-11 NOTE — HISTORY OF PRESENT ILLNESS
[FreeTextEntry1] : 66-year-old female who presents for follow-up visit for rectal bleeding.  Her bleeding has improved significantly and she has been using hydrocortisone suppositories as prescribed.  She is having regular bowel movements without difficulty although with laying still with constipation.  She recently started fiber supplementation on a regular basis to help with this.  She was also recently diagnosed with lichen sclerosis based on biopsy of the perineal region by her gynecologist.  \par \par Last colonoscopy in March 2021 by Dr. Elisha Underwood which showed hemorrhoids and colon polyps.

## 2021-11-11 NOTE — PHYSICAL EXAM
[Normal] : was normal [None] : there was no rectal mass  [Normal Rate and Rhythm] : normal rate and rhythm [Respiratory Effort] : normal respiratory effort [Calm] : calm [Gross Blood] : no gross blood [de-identified] : Soft, nontender, nondistended.  No mass or hernias appreciated. 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

## 2021-11-11 NOTE — PHYSICAL EXAM
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

## 2021-12-03 ENCOUNTER — RESULT REVIEW (OUTPATIENT)
Age: 66
End: 2021-12-03

## 2021-12-03 ENCOUNTER — APPOINTMENT (OUTPATIENT)
Dept: GASTROENTEROLOGY | Facility: CLINIC | Age: 66
End: 2021-12-03
Payer: MEDICARE

## 2021-12-03 DIAGNOSIS — Z87.59 PERSONAL HISTORY OF OTHER COMPLICATIONS OF PREGNANCY, CHILDBIRTH AND THE PUERPERIUM: ICD-10-CM

## 2021-12-03 PROCEDURE — 99204 OFFICE O/P NEW MOD 45 MIN: CPT

## 2021-12-03 NOTE — HISTORY OF PRESENT ILLNESS
[de-identified] : 65 y/o mother of one with Hx of HTN, hypercholesterolemia,, s/p csection, ectopic pregnancy, endometriosis who presents to establish care and also to discuss requirements of Dexilant. \par \par Dr. Stern was her prior GI. \par \par She recalls vomiting bile in the past - had upper endoscopy two years ago - was recommended to take Dexilant 60 mg once day - she continues to take Dexilant.  She was recommended to ween off by her PCP.   \par \par She says she has acid reflux - belching, regurgitation, burning - a couple of times a week - it has gotten better with Dexilant since two years.  No dysphagia, no odynophagia.  \par \par Get nauseas once a month.  IN the past vomiting more regularly but that seems to have resolved a a couple of months ago.  However she vomited two weeks.  Vomiting would usually occur at 2 or 3 AM - no abdominal associated with vomiting.  Still needing Zofran ever now and then.  No blood in vomit.\par \par No loss of appetite, no weight loss.  \par \par \par She says she a lot of rectal bleeding - she took hemorrhoidal creams and is feel better. \par \par At times has difficulty having a bowel movement - feels constipated - hurts to have a bowel movement at times in the rectum.  Takes Benefiber.  Gags with Metamucil.  bowel movement have been over getting better.  No melena.\par \par Blood count on 8/2021 - normal H/H. \par \par Seen Dr. Ramirez on 11/11/2021 - anoscopy revealed grade 2 internal hemorrhoids.  \par \par She says she has a total of three colonoscopies - only her last colonoscopy in 3/3021 polyps were removed.  Colonoscopy 3/2021 by Dr. Elisha Underwood which showed hemorrhoids and 4 mm polyp removed sigmoid colon, sigmoid diverticulosis, 8 mm polyp removed from ascending colon.  5 mm polyp removed from ascending colon. Repeat colonoscopy in 3 years. \par \par Gastric emptying scan on 8/2020:  Normal gastric emptying scan.\par \par She recalls have two CT scans for vomiting - last CT 3 or 4 years ago. \par \par Patient denies family history of GI cancers.\par \par All other review of systems are negative.  Denies cardiac symptoms.\par

## 2021-12-03 NOTE — PHYSICAL EXAM
[General Appearance - Alert] : alert [General Appearance - In No Acute Distress] : in no acute distress [Sclera] : the sclera and conjunctiva were normal [Extraocular Movements] : extraocular movements were intact [Outer Ear] : the ears and nose were normal in appearance [Hearing Threshold Finger Rub Not [place]] : hearing was normal [Neck Appearance] : the appearance of the neck was normal [Neck Cervical Mass (___cm)] : no neck mass was observed [Auscultation Breath Sounds / Voice Sounds] : lungs were clear to auscultation bilaterally [Heart Rate And Rhythm] : heart rate was normal and rhythm regular [Heart Sounds] : normal S1 and S2 [Heart Sounds Gallop] : no gallops [Murmurs] : no murmurs [Heart Sounds Pericardial Friction Rub] : no pericardial rub [Bowel Sounds] : normal bowel sounds [Abdomen Soft] : soft [Abdomen Tenderness] : non-tender [Abdomen Mass (___ Cm)] : no abdominal mass palpated [Cervical Lymph Nodes Enlarged Posterior Bilaterally] : posterior cervical [Supraclavicular Lymph Nodes Enlarged Bilaterally] : supraclavicular [Abnormal Walk] : normal gait [Nail Clubbing] : no clubbing  or cyanosis of the fingernails [Skin Color & Pigmentation] : normal skin color and pigmentation [] : no rash [Oriented To Time, Place, And Person] : oriented to person, place, and time [Impaired Insight] : insight and judgment were intact [Affect] : the affect was normal [FreeTextEntry1] : Lower abdominal scar

## 2021-12-03 NOTE — ASSESSMENT
[FreeTextEntry1] : IMPRESSION: \par #  History of acid reflux - belching, regurgitation , burning - better since being on Dexilant 60 ng once  a day for 2 years - concerned of Dexilant \par -  Upper endoscopy 2 years ago - unremarkable from what she recalls - she brought records today - not yet scanned. \par \par #  History of vomiting since two years - getting better - two weeks ago vomiting - mainly at night \par -  No abdominal pain. \par -  Zofran as needed.  \par -  Gastric emptying scan on 8/2020: Normal gastric emptying scan. \par -  Last CT scan 3 or 4 years ago \par \par #  Rectal bleeding - getting better with fiber \par -  Blood count on 8/2021 - normal H/H. \par -  Seen Dr. Ramirez on 11/11/2021 - anoscopy revealed grade 2 internal hemorrhoids. Improving with fiber and hydrocortisone suppositories \par -  She says she has a total of three colonoscopies in her life time - only has colonoscopy in 3/2021 polyps were found and removed.\par -   Colonoscopy 3/2021 by Dr. Elisha Underwood which showed hemorrhoids and 4 mm polyp removed sigmoid colon, sigmoid diverticulosis, 8 mm polyp removed from ascending colon. 5 mm polyp removed from ascending colon. Repeat colonoscopy in 3 years. \par \par #  Comorbidities:  HTN, Hypercholesterolemia, obesity, elevated HgA1C\par \par #  Surgeries:  s/p csection, ectopic pregnancy, endometriosis\par \par \par PLAN: \par We discussed long term side effects of Dexilant - since she not recall having Hoskins's esophagus, she reflux is under control - advised to discontinue Dexilant - switch to Pepcid 20 mg twice a day - I will look at her upper endoscopy records from 2 years ago and will call her - she was advised to call me in 2 weeks to discuss.  \par \par CT scan of the abd/pelvis with IV contrast to r/o hernias.  Blood test prior.\par Risks of obstruction reviewed because of likely adhesions\par \par Discussed an upper endoscopy - she would like hold off for now until review of prior upper endoscopy.   We discussed possible repeat colonoscopy - since colonoscopy recently done and her rectal bleeding better she will hold off on procedure.

## 2021-12-29 ENCOUNTER — OUTPATIENT (OUTPATIENT)
Dept: OUTPATIENT SERVICES | Facility: HOSPITAL | Age: 66
LOS: 1 days | End: 2021-12-29
Payer: MEDICARE

## 2021-12-29 ENCOUNTER — APPOINTMENT (OUTPATIENT)
Dept: CT IMAGING | Facility: CLINIC | Age: 66
End: 2021-12-29
Payer: MEDICARE

## 2021-12-29 DIAGNOSIS — R11.10 VOMITING, UNSPECIFIED: ICD-10-CM

## 2021-12-29 PROCEDURE — G1004: CPT

## 2021-12-29 PROCEDURE — 74177 CT ABD & PELVIS W/CONTRAST: CPT | Mod: MG

## 2021-12-29 PROCEDURE — 74177 CT ABD & PELVIS W/CONTRAST: CPT | Mod: 26,MG

## 2021-12-30 ENCOUNTER — NON-APPOINTMENT (OUTPATIENT)
Age: 66
End: 2021-12-30

## 2021-12-30 LAB
ALBUMIN SERPL ELPH-MCNC: 4.1 G/DL
ALP BLD-CCNC: 100 U/L
ALT SERPL-CCNC: 12 U/L
ANION GAP SERPL CALC-SCNC: 11 MMOL/L
AST SERPL-CCNC: 14 U/L
BASOPHILS # BLD AUTO: 0.04 K/UL
BASOPHILS NFR BLD AUTO: 0.7 %
BILIRUB SERPL-MCNC: 0.3 MG/DL
BUN SERPL-MCNC: 9 MG/DL
CALCIUM SERPL-MCNC: 9 MG/DL
CHLORIDE SERPL-SCNC: 105 MMOL/L
CO2 SERPL-SCNC: 24 MMOL/L
CREAT SERPL-MCNC: 0.69 MG/DL
EOSINOPHIL # BLD AUTO: 0.16 K/UL
EOSINOPHIL NFR BLD AUTO: 2.6 %
GLUCOSE SERPL-MCNC: 93 MG/DL
HCT VFR BLD CALC: 41.8 %
HGB BLD-MCNC: 13.6 G/DL
IMM GRANULOCYTES NFR BLD AUTO: 0.2 %
LPL SERPL-CCNC: 34 U/L
LYMPHOCYTES # BLD AUTO: 0.98 K/UL
LYMPHOCYTES NFR BLD AUTO: 16 %
MAN DIFF?: NORMAL
MCHC RBC-ENTMCNC: 30.2 PG
MCHC RBC-ENTMCNC: 32.5 GM/DL
MCV RBC AUTO: 92.9 FL
MONOCYTES # BLD AUTO: 0.55 K/UL
MONOCYTES NFR BLD AUTO: 9 %
NEUTROPHILS # BLD AUTO: 4.38 K/UL
NEUTROPHILS NFR BLD AUTO: 71.5 %
PLATELET # BLD AUTO: 250 K/UL
POTASSIUM SERPL-SCNC: 4.4 MMOL/L
PROT SERPL-MCNC: 5.9 G/DL
RBC # BLD: 4.5 M/UL
RBC # FLD: 12 %
SODIUM SERPL-SCNC: 141 MMOL/L
WBC # FLD AUTO: 6.12 K/UL

## 2022-01-11 ENCOUNTER — NON-APPOINTMENT (OUTPATIENT)
Age: 67
End: 2022-01-11

## 2022-01-11 ENCOUNTER — APPOINTMENT (OUTPATIENT)
Dept: INTERNAL MEDICINE | Facility: CLINIC | Age: 67
End: 2022-01-11
Payer: MEDICARE

## 2022-01-11 VITALS
TEMPERATURE: 98 F | BODY MASS INDEX: 28.17 KG/M2 | OXYGEN SATURATION: 98 % | HEART RATE: 78 BPM | SYSTOLIC BLOOD PRESSURE: 100 MMHG | HEIGHT: 64 IN | WEIGHT: 165 LBS | DIASTOLIC BLOOD PRESSURE: 80 MMHG

## 2022-01-11 PROCEDURE — 99214 OFFICE O/P EST MOD 30 MIN: CPT | Mod: 25

## 2022-01-11 PROCEDURE — 93000 ELECTROCARDIOGRAM COMPLETE: CPT

## 2022-01-11 NOTE — PLAN
[FreeTextEntry1] : Pt will start a low dose Paxil Cr and slowly increase dose every 3-4 weeks as needed.\par She is not suicidal and is very happy with her life.\par She will follow up with me in the next several weeks to advise me how she is feeling.\par EKG NSR @79 BPM

## 2022-01-11 NOTE — HISTORY OF PRESENT ILLNESS
[FreeTextEntry8] : 67 y/o female presents to the office today with anxiety.   She has been feeling more anxious over the last several weeks.  She has been feeling episodes of SOB.  She has had a hx of anxiety in the past but has been doing very well for years.\par She has been feeling intermittent chest tightness and palpitations

## 2022-01-11 NOTE — REVIEW OF SYSTEMS
[Palpitations] : palpitations [Anxiety] : anxiety [Negative] : Respiratory [Chest Pain] : no chest pain [Suicidal] : not suicidal [Insomnia] : no insomnia [Depression] : no depression

## 2022-03-01 ENCOUNTER — TRANSCRIPTION ENCOUNTER (OUTPATIENT)
Age: 67
End: 2022-03-01

## 2022-03-01 RX ORDER — PAROXETINE 12.5 MG/1
12.5 TABLET, FILM COATED, EXTENDED RELEASE ORAL
Qty: 90 | Refills: 0 | Status: DISCONTINUED | COMMUNITY
Start: 2022-01-11 | End: 2022-03-01

## 2022-03-28 ENCOUNTER — NON-APPOINTMENT (OUTPATIENT)
Age: 67
End: 2022-03-28

## 2022-03-30 ENCOUNTER — RX RENEWAL (OUTPATIENT)
Age: 67
End: 2022-03-30

## 2022-04-04 ENCOUNTER — RX RENEWAL (OUTPATIENT)
Age: 67
End: 2022-04-04

## 2022-04-14 ENCOUNTER — APPOINTMENT (OUTPATIENT)
Dept: COLORECTAL SURGERY | Facility: CLINIC | Age: 67
End: 2022-04-14
Payer: MEDICARE

## 2022-04-14 ENCOUNTER — NON-APPOINTMENT (OUTPATIENT)
Age: 67
End: 2022-04-14

## 2022-04-14 DIAGNOSIS — L90.0 LICHEN SCLEROSUS ET ATROPHICUS: ICD-10-CM

## 2022-04-14 DIAGNOSIS — S30.817A ABRASION OF ANUS, INITIAL ENCOUNTER: ICD-10-CM

## 2022-04-14 DIAGNOSIS — K64.4 RESIDUAL HEMORRHOIDAL SKIN TAGS: ICD-10-CM

## 2022-04-14 PROCEDURE — 46600 DIAGNOSTIC ANOSCOPY SPX: CPT

## 2022-04-14 PROCEDURE — 99214 OFFICE O/P EST MOD 30 MIN: CPT | Mod: 25

## 2022-04-14 NOTE — PHYSICAL EXAM
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

## 2022-04-14 NOTE — HISTORY OF PRESENT ILLNESS
[FreeTextEntry1] : 67-year-old female who presents for follow-up visit for rectal bleeding.  She has previously treated hemorrhoids with hydrocortisone suppository with improvement.  Recently, she noticed rectal bleeding and a burning painful sensation in the perianal region extending towards the vulva.  Although her stools are soft, she sometimes has difficulty expelling the stool and require straining.  She has a history of lichen sclerosis based on biopsies and has been using medication in this area intermittently to manage those symptoms\par \par Last colonoscopy in March 2021 by Dr. Elisha Underwood which showed hemorrhoids and colon polyps.

## 2022-04-25 ENCOUNTER — RX RENEWAL (OUTPATIENT)
Age: 67
End: 2022-04-25

## 2022-04-30 ENCOUNTER — TRANSCRIPTION ENCOUNTER (OUTPATIENT)
Age: 67
End: 2022-04-30

## 2022-04-30 ENCOUNTER — NON-APPOINTMENT (OUTPATIENT)
Age: 67
End: 2022-04-30

## 2022-05-02 ENCOUNTER — OUTPATIENT (OUTPATIENT)
Dept: OUTPATIENT SERVICES | Facility: HOSPITAL | Age: 67
LOS: 1 days | End: 2022-05-02

## 2022-05-02 ENCOUNTER — TRANSCRIPTION ENCOUNTER (OUTPATIENT)
Age: 67
End: 2022-05-02

## 2022-05-02 ENCOUNTER — APPOINTMENT (OUTPATIENT)
Dept: DISASTER EMERGENCY | Facility: HOSPITAL | Age: 67
End: 2022-05-02

## 2022-05-02 VITALS
OXYGEN SATURATION: 99 % | TEMPERATURE: 99 F | HEART RATE: 73 BPM | SYSTOLIC BLOOD PRESSURE: 126 MMHG | HEIGHT: 64 IN | WEIGHT: 162.92 LBS | DIASTOLIC BLOOD PRESSURE: 78 MMHG | RESPIRATION RATE: 16 BRPM

## 2022-05-02 VITALS
SYSTOLIC BLOOD PRESSURE: 125 MMHG | TEMPERATURE: 98 F | RESPIRATION RATE: 16 BRPM | OXYGEN SATURATION: 100 % | DIASTOLIC BLOOD PRESSURE: 80 MMHG | HEART RATE: 71 BPM

## 2022-05-02 DIAGNOSIS — U07.1 COVID-19: ICD-10-CM

## 2022-05-02 RX ORDER — BEBTELOVIMAB 87.5 MG/ML
175 INJECTION, SOLUTION INTRAVENOUS ONCE
Refills: 0 | Status: COMPLETED | OUTPATIENT
Start: 2022-05-02 | End: 2022-05-02

## 2022-05-02 RX ADMIN — BEBTELOVIMAB 175 MILLIGRAM(S): 87.5 INJECTION, SOLUTION INTRAVENOUS at 14:50

## 2022-05-02 NOTE — CHART NOTE - NSCHARTNOTEFT_GEN_A_CORE
CC: Monoclonal Antibody Infusion/COVID 19 Positive  67y Female with PMH of HTN, HLD  and recent dx of COVID 19+ who presents today for elective Bebtelovimab. Patient has been screened and was deemed to be a candidate.    Symptoms/ Criteria: Rhinorrhea, cough, Fatigue   Date of Symptom Onset: 4/29  Symptoms:  Rhinorrhea, cough, Fatigue   Date of Positive COVID PCR: 4/30  Risk Profile includes:   age >55    Vaccination Status:     PMHx:  Infection due to severe acute respiratory syndrome coronavirus 2 (SARS-CoV-2)    Hypertension    Hyperlipidemia        Exam/findings:  T(C): 37 (05-02-22 @ 14:37), Max: 37 (05-02-22 @ 14:37)  HR: 73 (05-02-22 @ 14:37) (73 - 73)  BP: 126/78 (05-02-22 @ 14:37) (126/78 - 126/78)  RR: 16 (05-02-22 @ 14:37) (16 - 16)  SpO2: 99% (05-02-22 @ 14:37) (99% - 99%)    PE:   Appearance: NAD	  HEENT:  NC/AT  Cardiovascular:  No edema  Respiratory: no use of accessory muscles  Gastrointestinal:  non-distended   Skin: warm and dry  Neurologic: Non-focal  Extremities: Normal range of motion    ASSESSMENT:  Pt is COVID positive with mild to moderate symptoms who was referred for elective MAB (Bebtelovimab). referred by Dr Cooper     PLAN:  - MAB treatment explained to patient. I have reviewed the Bebtelovimab Emergency Use Authorization (EUA) and I have provided the patient or patient's caregiver with the following information:   1. FDA has authorized emergency use of Bebtelovimab to be administered for the treatment of mild to moderate COVID-19, which is not an FDA-approved biological product.   2. The patient or patient's caregiver has the option to accept or refuse administration of MAB.   3. The significant known and potential risks and benefits of Bebtelovimab and the extent to which such risks and benefits are unknown.  4. Information on available alternative treatments and risks and benefits of those alternatives.  - Patient verbalized understanding of plan and agrees to treatment. All questions answered.  - Consent for MAB obtained.   - 175mg of Bebtelovimab administered as a single intravenous injection over at least 30 seconds.   - Observe patient for one hour post medication administration and then if stable, discharge home with oupatient follow up as planned by PCP.      POST ASSESSMENT:   Patient completed MAB, and monitored x 1 hour post-infusion with no adverse reactions noted, remained hemodynamically stable.  - Patient tolerated infusion well; denies complaints of chest pain/SOB/dizziness/palpitations.   - VSS for discharge home.  - D/C instructions given/ fact sheet included.  - Patient was instructed to self-isolate and use infection control measures (e.g wear mask, isolate, social distance, avoid sharing personal items, clean and disinfect "high touch" surfaces, and frequent handwashing according to the CDC guidelines.   - The patient was informed on what symptoms to be aware of for the next couple of days, and if there are any issues to call the 24/7 clinical call center. Patient was instructed to follow up with primary care provider as needed.    DISCHARGE at __________

## 2022-06-01 ENCOUNTER — RX CHANGE (OUTPATIENT)
Age: 67
End: 2022-06-01

## 2022-06-07 ENCOUNTER — APPOINTMENT (OUTPATIENT)
Dept: GASTROENTEROLOGY | Facility: CLINIC | Age: 67
End: 2022-06-07
Payer: MEDICARE

## 2022-06-07 VITALS
HEART RATE: 88 BPM | DIASTOLIC BLOOD PRESSURE: 83 MMHG | SYSTOLIC BLOOD PRESSURE: 112 MMHG | HEIGHT: 64 IN | WEIGHT: 165 LBS | OXYGEN SATURATION: 98 % | BODY MASS INDEX: 28.17 KG/M2

## 2022-06-07 PROCEDURE — 99214 OFFICE O/P EST MOD 30 MIN: CPT

## 2022-06-07 NOTE — HISTORY OF PRESENT ILLNESS
[de-identified] : 65 y/o mother of one with Hx of HTN, hypercholesterolemia, s/p csection, ectopic pregnancy, endometriosis who presents with complaints with rectal bleeding. \par \par She has been having rectal bleeding - on Friday she had more rectal bleeding "a lot of red blood". Continues to have "a lot of red blood". She got nervous.   Offers no other complaints.  No longer taking Dexilant.  No longer with vomiting. \par \par \par \par Office visit from 12/2021: \par Dr. Stern was her prior GI. \par \par She recalls vomiting bile in the past - had upper endoscopy two years ago - was recommended to take Dexilant 60 mg once day - she continues to take Dexilant. She was recommended to ween off by her PCP. \par \par She says she has acid reflux - belching, regurgitation, burning - a couple of times a week - it has gotten better with Dexilant since two years. No dysphagia, no odynophagia. \par \par Get nauseas once a month. IN the past vomiting more regularly but that seems to have resolved a a couple of months ago. However she vomited two weeks. Vomiting would usually occur at 2 or 3 AM - no abdominal associated with vomiting. Still needing Zofran ever now and then. No blood in vomit.\par \par No loss of appetite, no weight loss. \par \par \par She says she a lot of rectal bleeding - she took hemorrhoidal creams and is feel better. \par \par At times has difficulty having a bowel movement - feels constipated - hurts to have a bowel movement at times in the rectum. Takes Benefiber. Gags with Metamucil. bowel movement have been over getting better. No melena.\par \par Blood count on 8/2021 - normal H/H. \par \par Seen Dr. Ramirez on 11/11/2021 - anoscopy revealed grade 2 internal hemorrhoids. \par \par She says she has a total of three colonoscopies - only her last colonoscopy in 3/3021 polyps were removed. Colonoscopy 3/2021 by Dr. Elisha Underwood which showed hemorrhoids and 4 mm polyp removed sigmoid colon, sigmoid diverticulosis, 8 mm polyp removed from ascending colon. 5 mm polyp removed from ascending colon. Repeat colonoscopy in 3 years. \par \par Gastric emptying scan on 8/2020: Normal gastric emptying scan.\par \par She recalls have two CT scans for vomiting - last CT 3 or 4 years ago. \par \par Patient denies family history of GI cancers.\par \par Discussion/Summary 12/2021: \par called patient and reviewed recent CT scan - diverticulosis without diverticulitis - patient reports feeling well - but feeling constipated - she will try MiraLAX once a day and follow up with me. \par \par \par Discussion/Summary 1/2022: \par She says she has been vomiting again - on an off - wondering about her GB - no other complaints - will order ultrasound - advised going back on Dexilant for 3 to 4 weeks and follow up with me -.

## 2022-06-07 NOTE — ASSESSMENT
[FreeTextEntry1] : IMPRESSION: \par # History of rectal bleeding - recurred this Friday \par - Blood count on 8/2021 - normal H/H. \par - Seen Dr. Ramirez on 4/2022, 11/11/2021 - anoscopy revealed grade 2 internal hemorrhoids. Improving with fiber and hydrocortisone suppositories \par - Colonoscopy 3/2021 by Dr. Elisha Underwood which showed hemorrhoids and 4 mm polyp removed sigmoid colon, sigmoid diverticulosis, 8 mm polyp removed from ascending colon. 5 mm polyp removed from ascending colon. Repeat colonoscopy in 3 years. \par - She says she has a total of three colonoscopies in her life time - only has colonoscopy in 3/2021 polyps were found and removed.\par \par # History of acid reflux - no longer requiring Dexilant \par - Upper endoscopy 2 years ago - unremarkable from what she recalls - report not in chart\par \par # History of vomiting since two years - no longer vomiting\par -  CT scan A/P on 12/2021:  Three large diverticula of the second and third portions of duodenum.  Sigmoid diverticulosis.  Otherwise no evidence of acute inflammatory or obstructive process in the abd/pelvis. \par - Gastric emptying scan on 8/2020: Normal gastric emptying scan. \par - Last CT scan 3 or 4 years ago \par \par # Comorbidities: HTN, Hypercholesterolemia, obesity, elevated HgA1C\par \par # Surgeries: s/p csection, ectopic pregnancy, endometriosis\par \par \par PLAN: \par Rectal bleeding like due to internal hemorrhoids - discussed a colonoscopy to r/o colon polyps/lesions - she would like to hold off now - she will see rectal surgeon for possible IRC or hemorrhoid banding.  in the mean she will soften stools MiraLAX once with increase fluids.\par \par Blood test. \par \par Discussed an upper endoscopy - she will speak to with staff to get records.\par \par Follow up in 3 to 6 months

## 2022-06-07 NOTE — PHYSICAL EXAM
[General Appearance - Alert] : alert [General Appearance - In No Acute Distress] : in no acute distress [Sclera] : the sclera and conjunctiva were normal [Extraocular Movements] : extraocular movements were intact [Outer Ear] : the ears and nose were normal in appearance [Hearing Threshold Finger Rub Not Hernando] : hearing was normal [Neck Appearance] : the appearance of the neck was normal [Neck Cervical Mass (___cm)] : no neck mass was observed [Auscultation Breath Sounds / Voice Sounds] : lungs were clear to auscultation bilaterally [Heart Rate And Rhythm] : heart rate was normal and rhythm regular [Heart Sounds] : normal S1 and S2 [Heart Sounds Gallop] : no gallops [Murmurs] : no murmurs [Heart Sounds Pericardial Friction Rub] : no pericardial rub [Bowel Sounds] : normal bowel sounds [Abdomen Soft] : soft [Abdomen Tenderness] : non-tender [] : no hepato-splenomegaly [Abdomen Mass (___ Cm)] : no abdominal mass palpated [Normal Sphincter Tone] : normal sphincter tone [No Rectal Mass] : no rectal mass [Cervical Lymph Nodes Enlarged Posterior Bilaterally] : posterior cervical [Cervical Lymph Nodes Enlarged Anterior Bilaterally] : anterior cervical [Supraclavicular Lymph Nodes Enlarged Bilaterally] : supraclavicular [Abnormal Walk] : normal gait [Nail Clubbing] : no clubbing  or cyanosis of the fingernails [Oriented To Time, Place, And Person] : oriented to person, place, and time [Impaired Insight] : insight and judgment were intact [Affect] : the affect was normal [FreeTextEntry1] : No anal fissure

## 2022-06-10 ENCOUNTER — RX RENEWAL (OUTPATIENT)
Age: 67
End: 2022-06-10

## 2022-06-13 LAB
BASOPHILS # BLD AUTO: 0.05 K/UL
BASOPHILS NFR BLD AUTO: 1 %
EOSINOPHIL # BLD AUTO: 0.11 K/UL
EOSINOPHIL NFR BLD AUTO: 2.3 %
HCT VFR BLD CALC: 40.2 %
HGB BLD-MCNC: 12.9 G/DL
IMM GRANULOCYTES NFR BLD AUTO: 0.2 %
LYMPHOCYTES # BLD AUTO: 0.88 K/UL
LYMPHOCYTES NFR BLD AUTO: 18.1 %
MAN DIFF?: NORMAL
MCHC RBC-ENTMCNC: 29.3 PG
MCHC RBC-ENTMCNC: 32.1 GM/DL
MCV RBC AUTO: 91.4 FL
MONOCYTES # BLD AUTO: 0.52 K/UL
MONOCYTES NFR BLD AUTO: 10.7 %
NEUTROPHILS # BLD AUTO: 3.29 K/UL
NEUTROPHILS NFR BLD AUTO: 67.7 %
PLATELET # BLD AUTO: 205 K/UL
RBC # BLD: 4.4 M/UL
RBC # FLD: 12.6 %
WBC # FLD AUTO: 4.86 K/UL

## 2022-06-21 ENCOUNTER — APPOINTMENT (OUTPATIENT)
Dept: COLORECTAL SURGERY | Facility: CLINIC | Age: 67
End: 2022-06-21
Payer: MEDICARE

## 2022-06-21 VITALS
SYSTOLIC BLOOD PRESSURE: 112 MMHG | HEART RATE: 80 BPM | TEMPERATURE: 97.3 F | BODY MASS INDEX: 28.17 KG/M2 | DIASTOLIC BLOOD PRESSURE: 74 MMHG | HEIGHT: 64 IN | WEIGHT: 165 LBS

## 2022-06-21 DIAGNOSIS — K64.8 OTHER HEMORRHOIDS: ICD-10-CM

## 2022-06-21 DIAGNOSIS — K62.5 HEMORRHAGE OF ANUS AND RECTUM: ICD-10-CM

## 2022-06-21 PROCEDURE — 46221 LIGATION OF HEMORRHOID(S): CPT

## 2022-06-21 PROCEDURE — 99214 OFFICE O/P EST MOD 30 MIN: CPT | Mod: 25

## 2022-06-21 RX ORDER — BUPROPION HYDROCHLORIDE 300 MG/1
300 TABLET, EXTENDED RELEASE ORAL
Qty: 30 | Refills: 0 | Status: ACTIVE | COMMUNITY
Start: 2022-02-07

## 2022-06-21 NOTE — HISTORY OF PRESENT ILLNESS
[FreeTextEntry1] : 67-year-old female who presents for follow-up visit for rectal bleeding.  She has previously treated hemorrhoids with hydrocortisone suppository with improvement.  Recently, she noticed rectal bleeding and a burning painful sensation in the perianal region extending towards the vulva.  Although her stools are soft, she sometimes has difficulty expelling the stool and require straining.  She has a history of lichen sclerosis based on biopsies and has been using medication in this area intermittently to manage those symptoms\par \par Last colonoscopy in March 2021 by Dr. Elisha Underwood which showed hemorrhoids and colon polyps.  \par \par 6/21/22 Ms. Pardo presents to the office for follow-up.  3 weeks earlier, she reports noticing a good amount of blood per rectum with each bowel movement.  The blood was noted in the toilet bowl as well as with wiping.  Bowel movements are occasionally inconsistent and difficult to evacuate.  She does not regularly take MiraLAX.  The bleeding has since stopped over the last 4 to 5 days.  There is occasional discomfort but no significant hemorrhoidal burning or itching or pain.  Here for further evaluation and possible treatment.

## 2022-06-21 NOTE — ASSESSMENT
[FreeTextEntry1] : 67-year-old female with a history of rectal bleeding presumably from hemorrhoids who presents for follow-up due to recurrent bleeding and excoriation in the area.  On exam, she has noninflamed hemorrhoids.  She does have leukoplakia around the anus extending to the vulvar area. I recommend treating with a course of hydrocortisone.  I also recommend keeping the area clean and dry and moisture free to propagate healing.  She will continue to follow-up with her gynecologist regarding the lichen sclerosis.  Follow-up in 6 months.\par \par 6/21/22 Ms. Pardo returns to the office for follow-up secondary to recent episodes of rectal bleeding which have since resolved.  Her most recent colonoscopy was in March 2021.  JORGE ALBERTO and anoscopy today revealed mildly inflamed grade 2 internal hemorrhoids. We discussed the fact that all individuals possess hemorrhoids, but they are not notable, except in their symptomatic state. The pathophysiology of hemorrhoidal flares were reviewed, including the contribution of straining, hard stools, diarrheal stools in precipitating the symptoms. In order to regulate bowel regimen and prevent additional flares, recommendations were made for a high fiber diet (25-30g daily)  with ample water intake (80oz daily) +/- MiraLax daily. To alleviate the current hemorrhoidal discomfort, Anusol suppositories will be prescribed.  I have reassured her that the rectal bleeding is likely hemorrhoidal in nature especially given a recent colonoscopy and her reported inconsistent bowel habits.  All questions were answered to her satisfaction.

## 2022-06-21 NOTE — PHYSICAL EXAM
[Normal] : was normal [None] : there was no rectal mass  [Respiratory Effort] : normal respiratory effort [Calm] : calm [Gross Blood] : no gross blood [de-identified] : Leukoplakia of the perianal skin extending to the vaginal area [de-identified] : Grade 2 internal hemorrhoids [de-identified] : Well-appearing, in no distress [de-identified] : Normocephalic, atraumatic [de-identified] : Moves extremities without difficulty [de-identified] : Warm and dry [de-identified] : Alert and oriented x3

## 2022-07-15 ENCOUNTER — TRANSCRIPTION ENCOUNTER (OUTPATIENT)
Age: 67
End: 2022-07-15

## 2022-07-15 DIAGNOSIS — M85.80 OTHER SPECIFIED DISORDERS OF BONE DENSITY AND STRUCTURE, UNSPECIFIED SITE: ICD-10-CM

## 2022-07-15 DIAGNOSIS — E66.9 OBESITY, UNSPECIFIED: ICD-10-CM

## 2022-08-04 ENCOUNTER — APPOINTMENT (OUTPATIENT)
Dept: INTERNAL MEDICINE | Facility: CLINIC | Age: 67
End: 2022-08-04

## 2022-08-04 VITALS
BODY MASS INDEX: 28.17 KG/M2 | SYSTOLIC BLOOD PRESSURE: 120 MMHG | WEIGHT: 165 LBS | DIASTOLIC BLOOD PRESSURE: 70 MMHG | TEMPERATURE: 98.1 F | HEIGHT: 64 IN | HEART RATE: 76 BPM | OXYGEN SATURATION: 98 %

## 2022-08-04 LAB
25(OH)D3 SERPL-MCNC: 47.7 NG/ML
ALBUMIN SERPL ELPH-MCNC: 4.8 G/DL
ALP BLD-CCNC: 86 U/L
ALT SERPL-CCNC: 10 U/L
ANION GAP SERPL CALC-SCNC: 13 MMOL/L
AST SERPL-CCNC: 15 U/L
BASOPHILS # BLD AUTO: 0.04 K/UL
BASOPHILS NFR BLD AUTO: 0.6 %
BILIRUB SERPL-MCNC: 0.7 MG/DL
BUN SERPL-MCNC: 12 MG/DL
CALCIUM SERPL-MCNC: 9.4 MG/DL
CHLORIDE SERPL-SCNC: 104 MMOL/L
CHOLEST SERPL-MCNC: 183 MG/DL
CO2 SERPL-SCNC: 24 MMOL/L
CREAT SERPL-MCNC: 0.79 MG/DL
EGFR: 82 ML/MIN/1.73M2
EOSINOPHIL # BLD AUTO: 0.16 K/UL
EOSINOPHIL NFR BLD AUTO: 2.2 %
ESTIMATED AVERAGE GLUCOSE: 108 MG/DL
GLUCOSE SERPL-MCNC: 87 MG/DL
HBA1C MFR BLD HPLC: 5.4 %
HCT VFR BLD CALC: 44.5 %
HDLC SERPL-MCNC: 70 MG/DL
HGB BLD-MCNC: 14.7 G/DL
IMM GRANULOCYTES NFR BLD AUTO: 0.3 %
LDLC SERPL CALC-MCNC: 100 MG/DL
LYMPHOCYTES # BLD AUTO: 1.09 K/UL
LYMPHOCYTES NFR BLD AUTO: 15 %
MAN DIFF?: NORMAL
MCHC RBC-ENTMCNC: 29.8 PG
MCHC RBC-ENTMCNC: 33 GM/DL
MCV RBC AUTO: 90.3 FL
MONOCYTES # BLD AUTO: 0.58 K/UL
MONOCYTES NFR BLD AUTO: 8 %
NEUTROPHILS # BLD AUTO: 5.38 K/UL
NEUTROPHILS NFR BLD AUTO: 73.9 %
NONHDLC SERPL-MCNC: 113 MG/DL
PLATELET # BLD AUTO: 246 K/UL
POTASSIUM SERPL-SCNC: 4.5 MMOL/L
PROT SERPL-MCNC: 6.5 G/DL
RBC # BLD: 4.93 M/UL
RBC # FLD: 12.6 %
SODIUM SERPL-SCNC: 142 MMOL/L
TRIGL SERPL-MCNC: 65 MG/DL
WBC # FLD AUTO: 7.27 K/UL

## 2022-08-04 PROCEDURE — 90670 PCV13 VACCINE IM: CPT

## 2022-08-04 PROCEDURE — 99214 OFFICE O/P EST MOD 30 MIN: CPT | Mod: 25

## 2022-08-04 PROCEDURE — G0009: CPT

## 2022-08-04 NOTE — HISTORY OF PRESENT ILLNESS
[FreeTextEntry1] : 68 y/o female presents to the office today for a f/u visit and to go over results.  [de-identified] : Pt presents to the office today for follow up and review of FBW\par Pt feels well with no new complaints\par Compliant with medications and diet\par Exercise has been poor lately

## 2022-08-04 NOTE — PLAN
[FreeTextEntry1] : Pt will be going on [place] and request Scopolamine patch as she has used before in past\par FBW reviewed and doing well\par Pt will be given Prevnar 13 vaccination today \par Follow up again in 6 months\par Continue with healthy diet and restart exercise

## 2022-08-12 ENCOUNTER — RX RENEWAL (OUTPATIENT)
Age: 67
End: 2022-08-12

## 2022-10-05 ENCOUNTER — TRANSCRIPTION ENCOUNTER (OUTPATIENT)
Age: 67
End: 2022-10-05

## 2022-10-19 ENCOUNTER — TRANSCRIPTION ENCOUNTER (OUTPATIENT)
Age: 67
End: 2022-10-19

## 2022-10-27 ENCOUNTER — APPOINTMENT (OUTPATIENT)
Dept: GASTROENTEROLOGY | Facility: CLINIC | Age: 67
End: 2022-10-27

## 2022-10-27 VITALS — SYSTOLIC BLOOD PRESSURE: 125 MMHG | HEART RATE: 78 BPM | DIASTOLIC BLOOD PRESSURE: 71 MMHG | OXYGEN SATURATION: 99 %

## 2022-10-27 PROCEDURE — 99214 OFFICE O/P EST MOD 30 MIN: CPT

## 2022-10-27 NOTE — ASSESSMENT
[FreeTextEntry1] : IMPRESSION: \par # History of acid reflux - no longer requiring Dexilant - worsening reflux \par - Upper endoscopy 2 years ago - unremarkable from what she recalls - report still not in chart\par \par # History of vomiting since two years - no longer vomiting\par - CT scan A/P on 2021: Three large diverticula of the second and third portions of duodenum. Sigmoid diverticulosis. Otherwise no evidence of acute inflammatory or obstructive process in the abd/pelvis. \par - Gastric emptying scan on 2020: Normal gastric emptying scan. \par - Also CT scan 3 or 4 years ago\par \par # History of rectal bleeding from symptomatic internal hemorrhoids - seen colorectal surgeon - advised softening stools, with increased fiber, Miralax.  Previously I discussed a colonoscopy, wanted to hold off since performed not too long ago in 2021 \par - Colonoscopy 2021 by Dr. Elisha Underwood which showed hemorrhoids and 4 mm polyp removed sigmoid colon, sigmoid diverticulosis, 8 mm polyp removed from ascending colon. 5 mm polyp removed from ascending colon.  Pathology results not in chart.  Repeat colonoscopy in 3 years. \par - She says she has a total of three colonoscopies in her life time - only has colonoscopy in 2021 polyps were found and removed.\par \par # Comorbidities: HTN, Hypercholesterolemia, obesity, elevated HgA1C\par \par # Surgeries: s/p , ectopic pregnancy, endometriosis\par \par \par PLAN: \par Abdominal ultrasound \par I will plan for an upper endoscopy under monitored anesthesia care to rule out peptic ulcer disease, H.pylori gastritis etc.   Risks, benefits, and alternatives of the procedure were discussed with the patient. Patient understands and agrees to proceed with the planned procedure.\par Labs \par MiraLAX once a day. \par Follow up after above tests.\par

## 2022-10-27 NOTE — PHYSICAL EXAM
[Cervical Lymph Nodes Enlarged Posterior Bilaterally] : no posterior cervical lymphadenopathy [Supraclavicular Lymph Nodes Enlarged Bilaterally] : no supraclavicular lymphadenopathy [Abnormal Walk] : normal gait [No Clubbing, Cyanosis] : no clubbing or cyanosis of the fingernails [Normal Color / Pigmentation] : normal skin color and pigmentation [] : no rash [Normal] : oriented to person, place, and time

## 2022-10-27 NOTE — HISTORY OF PRESENT ILLNESS
[FreeTextEntry1] : 66 y/o mother of one with Hx of HTN, hypercholesterolemia, s/p csection, ectopic pregnancy, endometriosis who presents with complaints of acid reflux/vomiting.\par \par Vomiting recurred - in the past once in while \par Take famotidine - at times takes  it twice a day.  On worse day will take 3 H2 Blocker (but rare). \par Last week worse, every night. dyspepsia, and burning in midback.  gets nausea, liquid vomit \par No loss of appetite, but afraid to eat certain foods.  Since July she has lost 5 lbs - says she walks, takes Ozempic for weight loss. She has lost 90 llsb since almost 4 years. \par \par Patient denies having difficulty swallowing, painful swallowing, nausea, vomiting, melena and hematochezia.\par \par \par \par Office visit from 12/2021: \par Dr. Stern was her prior GI. \par \par She recalls vomiting bile in the past - had upper endoscopy two years ago - was recommended to take Dexilant 60 mg once day - she continues to take Dexilant. She was recommended to ween off by her PCP. \par \par She says she has acid reflux - belching, regurgitation, burning - a couple of times a week - it has gotten better with Dexilant since two years. No dysphagia, no odynophagia. \par \par Get nauseas once a month. IN the past vomiting more regularly but that seems to have resolved a a couple of months ago. However she vomited two weeks. Vomiting would usually occur at 2 or 3 AM - no abdominal associated with vomiting. Still needing Zofran ever now and then. No blood in vomit.\par \par No loss of appetite, no weight loss. \par \par \par She says she a lot of rectal bleeding - she took hemorrhoidal creams and is feel better. \par \par At times has difficulty having a bowel movement - feels constipated - hurts to have a bowel movement at times in the rectum. Takes Benefiber. Gags with Metamucil. bowel movement have been over getting better. No melena.\par \par Blood count on 8/2021 - normal H/H. \par \par Seen Dr. Ramirez on 11/11/2021 - anoscopy revealed grade 2 internal hemorrhoids. \par \par She says she has a total of three colonoscopies - only her last colonoscopy in 3/3021 polyps were removed. Colonoscopy 3/2021 by Dr. Elisha Underwood which showed hemorrhoids and 4 mm polyp removed sigmoid colon, sigmoid diverticulosis, 8 mm polyp removed from ascending colon. 5 mm polyp removed from ascending colon. Repeat colonoscopy in 3 years. \par \par Gastric emptying scan on 8/2020: Normal gastric emptying scan.\par \par She recalls have two CT scans for vomiting - last CT 3 or 4 years ago. \par \par Patient denies family history of GI cancers.\par \par Discussion/Summary 12/2021: \par called patient and reviewed recent CT scan - diverticulosis without diverticulitis - patient reports feeling well - but feeling constipated - she will try MiraLAX once a day and follow up with me. \par \par \par Discussion/Summary 1/2022: \par She says she has been vomiting again - on an off - wondering about her GB - no other complaints - will order ultrasound - advised going back on Dexilant for 3 to 4 weeks and follow up with me -. \par  \par \par Office visit 6/2022: \par \par She has been having rectal bleeding - on Friday she had more rectal bleeding "a lot of red blood". Continues to have "a lot of red blood". She got nervous. Offers no other complaints. No longer taking Dexilant. No longer with vomiting.

## 2022-10-31 LAB
ALBUMIN SERPL ELPH-MCNC: 4.1 G/DL
ALP BLD-CCNC: 78 U/L
ALT SERPL-CCNC: 11 U/L
ANION GAP SERPL CALC-SCNC: 10 MMOL/L
AST SERPL-CCNC: 14 U/L
BASOPHILS # BLD AUTO: 0.04 K/UL
BASOPHILS NFR BLD AUTO: 0.8 %
BILIRUB SERPL-MCNC: 0.3 MG/DL
BUN SERPL-MCNC: 10 MG/DL
CALCIUM SERPL-MCNC: 8.9 MG/DL
CHLORIDE SERPL-SCNC: 107 MMOL/L
CO2 SERPL-SCNC: 24 MMOL/L
CREAT SERPL-MCNC: 0.74 MG/DL
EGFR: 89 ML/MIN/1.73M2
ENDOMYSIUM IGA SER QL: NEGATIVE
ENDOMYSIUM IGA TITR SER: NORMAL
EOSINOPHIL # BLD AUTO: 0.1 K/UL
EOSINOPHIL NFR BLD AUTO: 2 %
GLIADIN IGA SER QL: <5 UNITS
GLIADIN IGG SER QL: <5 UNITS
GLIADIN PEPTIDE IGA SER-ACNC: NEGATIVE
GLIADIN PEPTIDE IGG SER-ACNC: NEGATIVE
GLUCOSE SERPL-MCNC: 80 MG/DL
HCT VFR BLD CALC: 40.1 %
HGB BLD-MCNC: 13.2 G/DL
IGA SER QL IEP: 130 MG/DL
IMM GRANULOCYTES NFR BLD AUTO: 0.2 %
LYMPHOCYTES # BLD AUTO: 0.98 K/UL
LYMPHOCYTES NFR BLD AUTO: 19.3 %
MAN DIFF?: NORMAL
MCHC RBC-ENTMCNC: 31.2 PG
MCHC RBC-ENTMCNC: 32.9 GM/DL
MCV RBC AUTO: 94.8 FL
MONOCYTES # BLD AUTO: 0.45 K/UL
MONOCYTES NFR BLD AUTO: 8.9 %
NEUTROPHILS # BLD AUTO: 3.49 K/UL
NEUTROPHILS NFR BLD AUTO: 68.8 %
PLATELET # BLD AUTO: 213 K/UL
POTASSIUM SERPL-SCNC: 4.6 MMOL/L
PROT SERPL-MCNC: 5.9 G/DL
RBC # BLD: 4.23 M/UL
RBC # FLD: 12.3 %
SODIUM SERPL-SCNC: 140 MMOL/L
TSH SERPL-ACNC: 2.11 UIU/ML
TTG IGA SER IA-ACNC: <1.2 U/ML
TTG IGA SER-ACNC: NEGATIVE
TTG IGG SER IA-ACNC: <1.2 U/ML
TTG IGG SER IA-ACNC: NEGATIVE
WBC # FLD AUTO: 5.07 K/UL

## 2022-11-04 ENCOUNTER — OFFICE (OUTPATIENT)
Dept: URBAN - METROPOLITAN AREA CLINIC 109 | Facility: CLINIC | Age: 67
Setting detail: OPHTHALMOLOGY
End: 2022-11-04
Payer: MEDICARE

## 2022-11-04 DIAGNOSIS — H25.13: ICD-10-CM

## 2022-11-04 PROCEDURE — 99213 OFFICE O/P EST LOW 20 MIN: CPT | Performed by: OPHTHALMOLOGY

## 2022-11-04 ASSESSMENT — KERATOMETRY
OD_K2POWER_DIOPTERS: 46.50
OD_K1POWER_DIOPTERS: 45.87
OS_K2POWER_DIOPTERS: 46.62
OS_AXISANGLE_DEGREES: 70
OD_AXISANGLE_DEGREES: 176
OS_K1POWER_DIOPTERS: 45.87

## 2022-11-04 ASSESSMENT — REFRACTION_CURRENTRX
OS_OVR_VA: 20/
OS_SPHERE: +1.75
OD_VPRISM_DIRECTION: SV
OD_OVR_VA: 20/
OD_SPHERE: -0.50
OD_AXIS: 00
OS_VPRISM_DIRECTION: SV
OD_SPHERE: +1.75
OD_CYLINDER: SPH
OD_OVR_VA: 20/
OS_AXIS: 00
OS_CYLINDER: SPH
OS_OVR_VA: 20/
OS_SPHERE: -0.50

## 2022-11-04 ASSESSMENT — REFRACTION_AUTOREFRACTION
OD_AXIS: 005
OS_SPHERE: PL
OD_CYLINDER: -0.50
OD_SPHERE: PLANO

## 2022-11-04 ASSESSMENT — VISUAL ACUITY
OS_BCVA: 20/20-1
OD_BCVA: 20/20-1

## 2022-11-04 ASSESSMENT — CONFRONTATIONAL VISUAL FIELD TEST (CVF)
OS_FINDINGS: FULL
OD_FINDINGS: FULL

## 2022-11-04 ASSESSMENT — TONOMETRY
OS_IOP_MMHG: 14
OD_IOP_MMHG: 14

## 2022-11-14 ENCOUNTER — APPOINTMENT (OUTPATIENT)
Dept: ULTRASOUND IMAGING | Facility: CLINIC | Age: 67
End: 2022-11-14

## 2022-11-14 ENCOUNTER — OUTPATIENT (OUTPATIENT)
Dept: OUTPATIENT SERVICES | Facility: HOSPITAL | Age: 67
LOS: 1 days | End: 2022-11-14
Payer: MEDICARE

## 2022-11-14 DIAGNOSIS — R11.10 VOMITING, UNSPECIFIED: ICD-10-CM

## 2022-11-14 PROCEDURE — 76700 US EXAM ABDOM COMPLETE: CPT | Mod: 26

## 2022-11-14 PROCEDURE — 76700 US EXAM ABDOM COMPLETE: CPT

## 2022-11-28 ENCOUNTER — NON-APPOINTMENT (OUTPATIENT)
Age: 67
End: 2022-11-28

## 2022-11-28 ENCOUNTER — RX RENEWAL (OUTPATIENT)
Age: 67
End: 2022-11-28

## 2022-11-30 ENCOUNTER — APPOINTMENT (OUTPATIENT)
Dept: GASTROENTEROLOGY | Facility: CLINIC | Age: 67
End: 2022-11-30

## 2022-11-30 VITALS
RESPIRATION RATE: 16 BRPM | HEIGHT: 64 IN | HEART RATE: 90 BPM | WEIGHT: 165 LBS | SYSTOLIC BLOOD PRESSURE: 120 MMHG | DIASTOLIC BLOOD PRESSURE: 77 MMHG | OXYGEN SATURATION: 96 % | BODY MASS INDEX: 28.17 KG/M2

## 2022-11-30 PROCEDURE — 99214 OFFICE O/P EST MOD 30 MIN: CPT

## 2022-11-30 NOTE — PHYSICAL EXAM
[Bowel Sounds] : normal bowel sounds [Abdomen Tenderness] : non-tender [No Masses] : no abdominal mass palpated [Abdomen Soft] : soft [] : no hepatosplenomegaly [Cervical Lymph Nodes Enlarged Posterior Bilaterally] : no posterior cervical lymphadenopathy [Supraclavicular Lymph Nodes Enlarged Bilaterally] : no supraclavicular lymphadenopathy [Cervical Lymph Nodes Enlarged Anterior Bilaterally] : no anterior cervical lymphadenopathy [Abnormal Walk] : normal gait [No Clubbing, Cyanosis] : no clubbing or cyanosis of the fingernails [Normal] : oriented to person, place, and time

## 2022-11-30 NOTE — REASON FOR VISIT
[Follow-up] : a follow-up of an existing diagnosis [Spouse] : spouse [FreeTextEntry1] : Nausea vomiting, epigastric pain

## 2022-11-30 NOTE — HISTORY OF PRESENT ILLNESS
[FreeTextEntry1] : 68 y/o mother of one with Hx of HTN, hypercholesterolemia, s/p csection, ectopic pregnancy, endometriosis who presents with complaints of burning epigastricic  pain and vomiting.\par \par Still vomiting, feels week, epigastr pain - radiates to the back - burning pain.  She says she just vomits lots of bile - 5 days of the week she will vomit.  \par \par No marijuana \par She says worries and her  worries - she is worried about cancer.  \par \par She says people around her say that she is loosing weight - "are you ok". Says she feels so drained.  \par \par  says he had test to evaluate his GB function and is considering if his wife can the test.\par \par She is no longer taking dexilant - she says even with the delanx she was vomiting.  \par Says last couple of months vomiting worse.    \par \par Initial of office visit in 12/2021: \par Dr. Stern was her prior GI. She recalls vomiting bile in the past - had upper endoscopy two years ago - was recommended to take Dexilant 60 mg once day - she continues to take Dexilant. She was recommended to ween off by her PCP. She says she has acid reflux - belching, regurgitation, burning - a couple of times a week - it has gotten better with Dexilant since two years. No dysphagia, no odynophagia. \par \par Get nauseas once a month. IN the past vomiting more regularly but that seems to have resolved a a couple of months ago. However she vomited two weeks. Vomiting would usually occur at 2 or 3 AM - no abdominal associated with vomiting. Still needing Zofran ever now and then. No blood in vomit.\par \par No loss of appetite, no weight loss. She says she a lot of rectal bleeding - she took hemorrhoidal creams and is feel better. \par \par At times has difficulty having a bowel movement - feels constipated - hurts to have a bowel movement at times in the rectum. Takes Benefiber. Gags with Metamucil. bowel movement have been over getting better. No melena.\par Blood count on 8/2021 - normal H/H. \par Seen Dr. Ramirez on 11/11/2021 - anoscopy revealed grade 2 internal hemorrhoids. \par \par She says she has a total of three colonoscopies - only her last colonoscopy in 3/3021 polyps were removed. Colonoscopy 3/2021 by Dr. Elisha Underwood which showed hemorrhoids and 4 mm polyp removed sigmoid colon, sigmoid diverticulosis, 8 mm polyp removed from ascending colon. 5 mm polyp removed from ascending colon. Repeat colonoscopy in 3 years. \par \par Gastric emptying scan on 8/2020: Normal gastric emptying scan.\par She recalls have two CT scans for vomiting - last CT 3 or 4 years ago. \par Patient denies family history of GI cancers.\par \par Discussion/Summary 12/2021: \par called patient and reviewed recent CT scan - diverticulosis without diverticulitis - patient reports feeling well - but feeling constipated - she will try MiraLAX once a day and follow up with me. \par \par Discussion/Summary 1/2022: \par She says she has been vomiting again - on an off - wondering about her GB - no other complaints - will order ultrasound - advised going back on Dexilant for 3 to 4 weeks and follow up with me -. \par  \par Office visit 6/2022: \par She has been having rectal bleeding - on Friday she had more rectal bleeding "a lot of red blood". Continues to have "a lot of red blood". She got nervous. Offers no other complaints. No longer taking Dexilant. No longer with vomiting. \par  \par \par Office visit 10/2022: \par Vomiting recurred - in the past once in while \par Take famotidine - at times takes it twice a day. On worse day will take 3 H2 Blocker (but rare). \par Last week worse, every night. dyspepsia, and burning in midback. gets nausea, liquid vomit \par No loss of appetite, but afraid to eat certain foods. Since July she has lost 5 lbs - says she walks, takes Ozempic for weight loss. She has lost 90 llsb since almost 4 years. \par Patient denies having difficulty swallowing, painful swallowing, nausea, vomiting, melena and hematochezia.

## 2022-12-02 ENCOUNTER — APPOINTMENT (OUTPATIENT)
Dept: MRI IMAGING | Facility: CLINIC | Age: 67
End: 2022-12-02

## 2022-12-02 PROCEDURE — 74183 MRI ABD W/O CNTR FLWD CNTR: CPT

## 2022-12-06 ENCOUNTER — NON-APPOINTMENT (OUTPATIENT)
Age: 67
End: 2022-12-06

## 2022-12-14 DIAGNOSIS — K21.9 GASTRO-ESOPHAGEAL REFLUX DISEASE W/OUT ESOPHAGITIS: ICD-10-CM

## 2022-12-19 LAB — SARS-COV-2 N GENE NPH QL NAA+PROBE: NOT DETECTED

## 2022-12-20 ENCOUNTER — TRANSCRIPTION ENCOUNTER (OUTPATIENT)
Age: 67
End: 2022-12-20

## 2022-12-21 ENCOUNTER — APPOINTMENT (OUTPATIENT)
Dept: GASTROENTEROLOGY | Facility: HOSPITAL | Age: 67
End: 2022-12-21

## 2022-12-21 ENCOUNTER — OUTPATIENT (OUTPATIENT)
Dept: OUTPATIENT SERVICES | Facility: HOSPITAL | Age: 67
LOS: 1 days | End: 2022-12-21
Payer: MEDICARE

## 2022-12-21 DIAGNOSIS — R11.0 NAUSEA: ICD-10-CM

## 2022-12-21 PROCEDURE — 88313 SPECIAL STAINS GROUP 2: CPT

## 2022-12-21 PROCEDURE — 43239 EGD BIOPSY SINGLE/MULTIPLE: CPT

## 2022-12-21 PROCEDURE — 88305 TISSUE EXAM BY PATHOLOGIST: CPT | Mod: 26

## 2022-12-21 PROCEDURE — 88312 SPECIAL STAINS GROUP 1: CPT

## 2022-12-21 PROCEDURE — 88312 SPECIAL STAINS GROUP 1: CPT | Mod: 26

## 2022-12-21 PROCEDURE — 88305 TISSUE EXAM BY PATHOLOGIST: CPT

## 2022-12-21 PROCEDURE — 88313 SPECIAL STAINS GROUP 2: CPT | Mod: 26

## 2022-12-27 ENCOUNTER — APPOINTMENT (OUTPATIENT)
Dept: NUCLEAR MEDICINE | Facility: CLINIC | Age: 67
End: 2022-12-27

## 2022-12-27 ENCOUNTER — OUTPATIENT (OUTPATIENT)
Dept: OUTPATIENT SERVICES | Facility: HOSPITAL | Age: 67
LOS: 1 days | End: 2022-12-27

## 2022-12-27 DIAGNOSIS — R11.2 NAUSEA WITH VOMITING, UNSPECIFIED: ICD-10-CM

## 2022-12-27 PROCEDURE — 78227 HEPATOBIL SYST IMAGE W/DRUG: CPT | Mod: 26

## 2023-01-03 ENCOUNTER — APPOINTMENT (OUTPATIENT)
Dept: GASTROENTEROLOGY | Facility: CLINIC | Age: 68
End: 2023-01-03
Payer: MEDICARE

## 2023-01-12 ENCOUNTER — APPOINTMENT (OUTPATIENT)
Dept: GASTROENTEROLOGY | Facility: CLINIC | Age: 68
End: 2023-01-12
Payer: MEDICARE

## 2023-01-12 VITALS
SYSTOLIC BLOOD PRESSURE: 125 MMHG | DIASTOLIC BLOOD PRESSURE: 75 MMHG | HEART RATE: 89 BPM | BODY MASS INDEX: 28.32 KG/M2 | WEIGHT: 165 LBS | OXYGEN SATURATION: 98 %

## 2023-01-12 PROCEDURE — 99214 OFFICE O/P EST MOD 30 MIN: CPT

## 2023-01-12 NOTE — HISTORY OF PRESENT ILLNESS
[FreeTextEntry1] : 66 y/o mother of one with Hx of HTN, hypercholesterolemia, s/p csection, ectopic pregnancy, endometriosis who presents for followup since upper endoscopy. \par \par She is feeling better - no longer with vomiting - it has lessened.  Says no longer loosing weight.  No longer with pain since taking Dexilant 30 mg once a day - also takes Famotidine at night. \par \par \par Initial of office visit in 12/2021: \par Dr. Stern was her prior GI. She recalls vomiting bile in the past - had upper endoscopy two years ago - was recommended to take Dexilant 60 mg once day - she continues to take Dexilant. She was recommended to ween off by her PCP. She says she has acid reflux - belching, regurgitation, burning - a couple of times a week - it has gotten better with Dexilant since two years. No dysphagia, no odynophagia. \par \par Get nauseas once a month. IN the past vomiting more regularly but that seems to have resolved a a couple of months ago. However she vomited two weeks. Vomiting would usually occur at 2 or 3 AM - no abdominal associated with vomiting. Still needing Zofran ever now and then. No blood in vomit.\par \par No loss of appetite, no weight loss. She says she a lot of rectal bleeding - she took hemorrhoidal creams and is feel better. \par \par At times has difficulty having a bowel movement - feels constipated - hurts to have a bowel movement at times in the rectum. Takes Benefiber. Gags with Metamucil. bowel movement have been over getting better. No melena.\par Blood count on 8/2021 - normal H/H. \par Seen Dr. Ramirez on 11/11/2021 - anoscopy revealed grade 2 internal hemorrhoids. \par \par She says she has a total of three colonoscopies - only her last colonoscopy in 3/3021 polyps were removed. Colonoscopy 3/2021 by Dr. Elisha Underwood which showed hemorrhoids and 4 mm polyp removed sigmoid colon, sigmoid diverticulosis, 8 mm polyp removed from ascending colon. 5 mm polyp removed from ascending colon. Repeat colonoscopy in 3 years. \par \par Gastric emptying scan on 8/2020: Normal gastric emptying scan.\par She recalls have two CT scans for vomiting - last CT 3 or 4 years ago. \par Patient denies family history of GI cancers.\par \par Discussion/Summary 12/2021: \par called patient and reviewed recent CT scan - diverticulosis without diverticulitis - patient reports feeling well - but feeling constipated - she will try MiraLAX once a day and follow up with me. \par \par Discussion/Summary 1/2022: \par She says she has been vomiting again - on an off - wondering about her GB - no other complaints - will order ultrasound - advised going back on Dexilant for 3 to 4 weeks and follow up with me -. \par  \par Office visit 6/2022: \par She has been having rectal bleeding - on Friday she had more rectal bleeding "a lot of red blood". Continues to have "a lot of red blood". She got nervous. Offers no other complaints. No longer taking Dexilant. No longer with vomiting. \par  \par \par Office visit 10/2022: \par Vomiting recurred - in the past once in while \par Take famotidine - at times takes it twice a day. On worse day will take 3 H2 Blocker (but rare). \par Last week worse, every night. dyspepsia, and burning in midback. gets nausea, liquid vomit \par No loss of appetite, but afraid to eat certain foods. Since July she has lost 5 lbs - says she walks, takes Ozempic for weight loss. She has lost 90 lbs since almost 4 years. \par Patient denies having difficulty swallowing, painful swallowing, nausea, vomiting, melena and hematochezia. \par  \par \par \par Office visit 11/2022: \par Still vomiting, feels week, epigastric pain - radiates to the back - burning pain. She says she just vomits lots of bile - 5 days of the week she will vomit. \par \par No marijuana. She says worries and her  worries - she is worried about cancer. \par She says people around her say that she is loosing weight - "are you OK". Says she feels so drained. \par  says he had test to evaluate his GB function and is considering if his wife can the test.\par \par She is no longer taking Dexilant - she says even with the Dexilant she was vomiting. \par Says last couple of months vomiting worse.

## 2023-01-12 NOTE — ASSESSMENT
[FreeTextEntry1] : IMPRESSION: \par # Short segment Hoskins's esophagus without dysplasia - Dexilant 30 mg once a day\par -  EGD on 2022 by Dr. Justice:  Small Hiatal hernia with a small tongue of salmon colored mucosa s/p bx x3(Hoskins's esophagus without dysplasia).  Mild antral/body erythema s/p bx (neg for HP and IM).  Nml duodenum s/p bx (neg for celiac disease).  \par - Upper endoscopy 2 years ago - unremarkable from what she recalls - report still not in chart\par - Takes baby aspirin for year. \par \par # History of vomiting since two years - worsening since past couple of months \par -  MRI of the abd with IV contrast on 2022:  B/l renal cyst.  Otherwise normal. \par -  HIDA scan on 2022:  Nml.  GB EF of 96%\par - Abdominal ultrasound 2022: Nml\par - CT scan A/P on 2021: Three large diverticula of the second and third portions of duodenum. Sigmoid diverticulosis. Otherwise no evidence of acute inflammatory or obstructive process in the abd/pelvis. \par - Gastric emptying scan on 2020: Normal gastric emptying scan. \par - Also CT scan 3 or 4 years ago\par - No marijuana use\par \par # History of rectal bleeding from symptomatic internal hemorrhoids - seen colorectal surgeon \par - Colonoscopy 2021 by Dr. Elisha Underwood which showed hemorrhoids and 4 mm polyp removed sigmoid colon, sigmoid diverticulosis, 8 mm polyp removed from ascending colon. 5 mm polyp removed from ascending colon. Pathology results not in chart.  Repeat 2024. \par - She says she has a total of three colonoscopies in her life time - only has colonoscopy in 2021 polyps were found and removed.\par \par # Comorbidities: HTN, Hypercholesterolemia, obesity, elevated HgA1C\par \par # Surgeries: s/p , ectopic pregnancy, endometriosis\par \par \par PLAN: \par EGD in 3 to 5 years\par Continue Dexilant 30 mg once a day until next upper endoscopy. \par Colonoscopy 2024\par Follow up yearly \par

## 2023-01-12 NOTE — PHYSICAL EXAM
[Abnormal Walk] : normal gait [No Clubbing, Cyanosis] : no clubbing or cyanosis of the fingernails [Normal Color / Pigmentation] : normal skin color and pigmentation [] : no rash [Normal] : oriented to person, place, and time

## 2023-01-17 ENCOUNTER — TRANSCRIPTION ENCOUNTER (OUTPATIENT)
Age: 68
End: 2023-01-17

## 2023-02-15 ENCOUNTER — TRANSCRIPTION ENCOUNTER (OUTPATIENT)
Age: 68
End: 2023-02-15

## 2023-02-16 ENCOUNTER — TRANSCRIPTION ENCOUNTER (OUTPATIENT)
Age: 68
End: 2023-02-16

## 2023-02-21 ENCOUNTER — TRANSCRIPTION ENCOUNTER (OUTPATIENT)
Age: 68
End: 2023-02-21

## 2023-02-24 ENCOUNTER — RX CHANGE (OUTPATIENT)
Age: 68
End: 2023-02-24

## 2023-03-03 ENCOUNTER — APPOINTMENT (OUTPATIENT)
Dept: INTERNAL MEDICINE | Facility: CLINIC | Age: 68
End: 2023-03-03
Payer: MEDICARE

## 2023-03-03 VITALS
HEART RATE: 88 BPM | WEIGHT: 168 LBS | TEMPERATURE: 98.2 F | OXYGEN SATURATION: 98 % | HEIGHT: 64 IN | DIASTOLIC BLOOD PRESSURE: 80 MMHG | SYSTOLIC BLOOD PRESSURE: 118 MMHG | BODY MASS INDEX: 28.68 KG/M2

## 2023-03-03 PROCEDURE — 99213 OFFICE O/P EST LOW 20 MIN: CPT

## 2023-03-03 RX ORDER — BUPROPION HYDROCHLORIDE 150 MG/1
150 TABLET, EXTENDED RELEASE ORAL
Qty: 90 | Refills: 1 | Status: COMPLETED | COMMUNITY
End: 2023-03-03

## 2023-03-03 NOTE — HISTORY OF PRESENT ILLNESS
[FreeTextEntry1] : 66 y/o female presents to the office today to review GI labs  [de-identified] : She presents the office today for follow-up as she wanted to update me on her endoscopy results and her diagnosis with Hoskins's esophagus\par Patient for many years has been suffering with anxiety\par Patient is currently on Dexilant 30 mg daily for her reflux

## 2023-03-03 NOTE — PLAN
[FreeTextEntry1] : Discussed anxiety with patient\par Discussed medication with patient and answered all questions.  Discussed possible side effects with medication risks and benefits.\par Patient will start Prozac as this medication has the least likelihood of weight gain\par Patient will follow up with me in 1 month to let me know how the medication is doing at that time if we need to further adjust dose we will

## 2023-03-03 NOTE — REVIEW OF SYSTEMS
[Suicidal] : not suicidal [Insomnia] : no insomnia [Anxiety] : anxiety [Depression] : no depression [Negative] : Constitutional [FreeTextEntry8] : Reflux has been stable

## 2023-03-07 ENCOUNTER — RX RENEWAL (OUTPATIENT)
Age: 68
End: 2023-03-07

## 2023-03-18 ENCOUNTER — TRANSCRIPTION ENCOUNTER (OUTPATIENT)
Age: 68
End: 2023-03-18

## 2023-03-20 ENCOUNTER — TRANSCRIPTION ENCOUNTER (OUTPATIENT)
Age: 68
End: 2023-03-20

## 2023-03-23 ENCOUNTER — TRANSCRIPTION ENCOUNTER (OUTPATIENT)
Age: 68
End: 2023-03-23

## 2023-03-24 ENCOUNTER — TRANSCRIPTION ENCOUNTER (OUTPATIENT)
Age: 68
End: 2023-03-24

## 2023-03-24 RX ORDER — FAMOTIDINE 40 MG/1
40 TABLET, FILM COATED ORAL
Qty: 90 | Refills: 3 | Status: ACTIVE | COMMUNITY
Start: 2023-03-24 | End: 1900-01-01

## 2023-03-24 RX ORDER — DEXLANSOPRAZOLE 30 MG/1
30 CAPSULE, DELAYED RELEASE ORAL
Qty: 90 | Refills: 3 | Status: ACTIVE | COMMUNITY
Start: 2023-03-24 | End: 1900-01-01

## 2023-04-13 ENCOUNTER — APPOINTMENT (OUTPATIENT)
Dept: INTERNAL MEDICINE | Facility: CLINIC | Age: 68
End: 2023-04-13
Payer: MEDICARE

## 2023-04-13 VITALS
BODY MASS INDEX: 27.31 KG/M2 | DIASTOLIC BLOOD PRESSURE: 80 MMHG | OXYGEN SATURATION: 98 % | WEIGHT: 160 LBS | HEIGHT: 64 IN | SYSTOLIC BLOOD PRESSURE: 100 MMHG | TEMPERATURE: 98.3 F | HEART RATE: 79 BPM

## 2023-04-13 PROCEDURE — 99214 OFFICE O/P EST MOD 30 MIN: CPT

## 2023-04-13 RX ORDER — HYDROCORTISONE 25 MG/G
2.5 CREAM TOPICAL
Qty: 1 | Refills: 1 | Status: COMPLETED | COMMUNITY
Start: 2021-09-16 | End: 2023-04-13

## 2023-04-13 RX ORDER — HYDROCORTISONE ACETATE 25 MG/1
25 SUPPOSITORY RECTAL
Qty: 14 | Refills: 3 | Status: COMPLETED | COMMUNITY
Start: 2022-06-21 | End: 2023-04-13

## 2023-04-13 RX ORDER — ONDANSETRON HYDROCHLORIDE 8 MG/1
8 TABLET, FILM COATED ORAL
Refills: 0 | Status: COMPLETED | COMMUNITY
End: 2023-04-13

## 2023-04-13 RX ORDER — CLOBETASOL PROPIONATE 0.5 MG/G
0.05 OINTMENT TOPICAL
Qty: 15 | Refills: 0 | Status: COMPLETED | COMMUNITY
Start: 2022-04-15 | End: 2023-04-13

## 2023-04-13 RX ORDER — SEMAGLUTIDE 1.34 MG/ML
2 INJECTION, SOLUTION SUBCUTANEOUS
Qty: 30 | Refills: 0 | Status: COMPLETED | COMMUNITY
End: 2023-04-13

## 2023-04-13 RX ORDER — ONDANSETRON 4 MG/1
4 TABLET, ORALLY DISINTEGRATING ORAL 4 TIMES DAILY
Qty: 15 | Refills: 0 | Status: COMPLETED | COMMUNITY
Start: 2022-11-30 | End: 2023-04-13

## 2023-04-13 RX ORDER — FAMOTIDINE 40 MG/1
40 TABLET, FILM COATED ORAL
Qty: 30 | Refills: 3 | Status: COMPLETED | COMMUNITY
Start: 2022-03-28 | End: 2023-04-13

## 2023-04-13 RX ORDER — IBUPROFEN 600 MG/1
600 TABLET, FILM COATED ORAL 3 TIMES DAILY
Qty: 90 | Refills: 0 | Status: COMPLETED | COMMUNITY
Start: 2019-07-18 | End: 2023-04-13

## 2023-04-13 RX ORDER — DEXLANSOPRAZOLE 30 MG/1
30 CAPSULE, DELAYED RELEASE ORAL DAILY
Qty: 30 | Refills: 3 | Status: COMPLETED | COMMUNITY
Start: 2022-11-30 | End: 2023-04-13

## 2023-04-13 RX ORDER — SEMAGLUTIDE 1.34 MG/ML
2 INJECTION, SOLUTION SUBCUTANEOUS
Qty: 2 | Refills: 0 | Status: COMPLETED | COMMUNITY
Start: 2022-01-11 | End: 2023-04-13

## 2023-04-13 RX ORDER — HYDROCORTISONE ACETATE AND PRAMOXINE HYDROCHLORIDE 25; 10 MG/G; MG/G
2.5-1 CREAM TOPICAL
Qty: 1 | Refills: 2 | Status: COMPLETED | COMMUNITY
Start: 2021-08-10 | End: 2023-04-13

## 2023-04-13 RX ORDER — SEMAGLUTIDE 1.34 MG/ML
4 INJECTION, SOLUTION SUBCUTANEOUS
Qty: 3 | Refills: 0 | Status: COMPLETED | COMMUNITY
Start: 2022-06-10 | End: 2023-04-13

## 2023-04-13 RX ORDER — SCOPOLAMINE 1.5 MG/1
1 PATCH, EXTENDED RELEASE TRANSDERMAL
Qty: 1 | Refills: 0 | Status: COMPLETED | COMMUNITY
Start: 2022-08-04 | End: 2023-04-13

## 2023-04-13 NOTE — HISTORY OF PRESENT ILLNESS
[No Pertinent Pulmonary History] : no history of asthma, COPD, sleep apnea, or smoking [No Adverse Anesthesia Reaction] : no adverse anesthesia reaction in self or family member [(Patient denies any chest pain, claudication, dyspnea on exertion, orthopnea, palpitations or syncope)] : Patient denies any chest pain, claudication, dyspnea on exertion, orthopnea, palpitations or syncope [Aortic Stenosis] : no aortic stenosis [Atrial Fibrillation] : no atrial fibrillation [Coronary Artery Disease] : no coronary artery disease [Recent Myocardial Infarction] : no recent myocardial infarction [Implantable Device/Pacemaker] : no implantable device/pacemaker [Chronic Anticoagulation] : no chronic anticoagulation [Chronic Kidney Disease] : no chronic kidney disease [Diabetes] : no diabetes [FreeTextEntry1] : Left Hip replacement  [FreeTextEntry2] : 04/24/2023  [FreeTextEntry3] : Dr. Gutierres [FreeTextEntry4] : 67 y/o female presents to the office today for a Medical Clearance \par Type of Surgery: Left Hip Replacement \par Name of Surgeon: Dr. Salvador Gutierres\par DOS: 04/24/2023 \par Location: [facility]

## 2023-04-13 NOTE — ASSESSMENT
[Patient Optimized for Surgery] : Patient optimized for surgery [As per surgery] : as per surgery [FreeTextEntry6] : ASA 81mg as per surgeon  [FreeTextEntry7] : Pt will take atenolol and Fluoxetine the morning of surgery with small sips of water

## 2023-04-13 NOTE — PLAN
[FreeTextEntry1] : Pt is medically cleared for surgery\par ASA therapy as per surgeon.\par Pt will hold on taking all supplements and vitamins 10 days prior to surgery\par Pt will take Atenolol and Fluoxetine the morning of surgery with small sips of water \par

## 2023-04-26 ENCOUNTER — NON-APPOINTMENT (OUTPATIENT)
Age: 68
End: 2023-04-26

## 2023-05-12 ENCOUNTER — APPOINTMENT (OUTPATIENT)
Dept: INTERNAL MEDICINE | Facility: CLINIC | Age: 68
End: 2023-05-12
Payer: MEDICARE

## 2023-05-12 VITALS
SYSTOLIC BLOOD PRESSURE: 100 MMHG | TEMPERATURE: 98.7 F | HEART RATE: 78 BPM | BODY MASS INDEX: 28.68 KG/M2 | OXYGEN SATURATION: 98 % | DIASTOLIC BLOOD PRESSURE: 70 MMHG | HEIGHT: 64 IN | WEIGHT: 168 LBS

## 2023-05-12 DIAGNOSIS — R21 RASH AND OTHER NONSPECIFIC SKIN ERUPTION: ICD-10-CM

## 2023-05-12 PROCEDURE — 99213 OFFICE O/P EST LOW 20 MIN: CPT

## 2023-05-12 NOTE — PHYSICAL EXAM
[Normal] : no acute distress, well nourished, well developed and well-appearing [de-identified] : Surgical site healed well no erythema no discharge.  Previous rash after surgery has subsided with cortisone cream

## 2023-05-12 NOTE — HISTORY OF PRESENT ILLNESS
[FreeTextEntry1] : 69 y/o female presents to the office today for a f/u visit from hip surgery  [de-identified] : Patient presents the office today for follow-up after left hip replacement\par Patient had rash after surgery in left groin region that subsided with topical cortisone\par Patient has been moving around doing PT feeling well\par She is currently on 281 mg aspirins daily and 15 mg of meloxicam daily Tylenol as needed for pain

## 2023-05-12 NOTE — PLAN
[FreeTextEntry1] : Patient is doing well since surgery she will continue daily activity continue her physical therapy\par Rash has since subsided\par Patient will follow-up as needed

## 2023-05-24 ENCOUNTER — RX CHANGE (OUTPATIENT)
Age: 68
End: 2023-05-24

## 2023-06-21 ENCOUNTER — RX RENEWAL (OUTPATIENT)
Age: 68
End: 2023-06-21

## 2023-08-14 NOTE — PHYSICAL EXAM
[No Acute Distress] : no acute distress [Well Nourished] : well nourished [Normal Sclera/Conjunctiva] : normal sclera/conjunctiva [PERRL] : pupils equal round and reactive to light [Normal Outer Ear/Nose] : the outer ears and nose were normal in appearance [Normal Oropharynx] : the oropharynx was normal [Supple] : supple [No Lymphadenopathy] : no lymphadenopathy [No Respiratory Distress] : no respiratory distress  [No Accessory Muscle Use] : no accessory muscle use [Clear to Auscultation] : lungs were clear to auscultation bilaterally [Normal Rate] : normal rate  [Regular Rhythm] : with a regular rhythm [Normal S1, S2] : normal S1 and S2 [No Edema] : there was no peripheral edema [No Extremity Clubbing/Cyanosis] : no extremity clubbing/cyanosis [Soft] : abdomen soft [Normal Bowel Sounds] : normal bowel sounds [Non Tender] : non-tender [Non-distended] : non-distended [Coordination Grossly Intact] : coordination grossly intact [Normal Posterior Cervical Nodes] : no posterior cervical lymphadenopathy [Normal Anterior Cervical Nodes] : no anterior cervical lymphadenopathy [No Focal Deficits] : no focal deficits [Normal Affect] : the affect was normal [Normal Insight/Judgement] : insight and judgment were intact [Alert and Oriented x3] : oriented to person, place, and time Secondary Intention Text (Leave Blank If You Do Not Want): The defect will heal with secondary intention.

## 2023-09-14 ENCOUNTER — RX RENEWAL (OUTPATIENT)
Age: 68
End: 2023-09-14

## 2023-09-15 ASSESSMENT — HOOS JR
WALKING ON UNEVEN SURFACE: MILD
IMPORTED HOOS JR SCORE: 76.78
IMPORTED HOOS JR SCORE: 43.34
HOOS JR RAW SCORE: 4
BENDING TO THE FLOOR TO PICK UP OBJECT: SEVERE
WALKING ON UNEVEN SURFACE: SEVERE
IMPORTED HOOS JR SCORE: 76.78
IMPORTED LATERALITY: RIGHT
IMPORTED HOOS JR SCORE: 100.0
HOOS JR RAW SCORE: 0
BENDING TO THE FLOOR TO PICK UP OBJECT: SEVERE
RISING FROM SITTING: MILD
GOING UP OR DOWN STAIRS: SEVERE
LYING IN BED (TURNING OVER, MAINTAINING HIP POSITION): MODERATE
LYING IN BED (TURNING OVER, MAINTAINING HIP POSITION): MODERATE
IMPORTED HOOS JR SCORE: 43.34
SITTING: MILD
HOOS JR RAW SCORE: 0
LYING IN BED (TURNING OVER, MAINTAINING HIP POSITION): SEVERE
RISING FROM SITTING: MODERATE
SITTING: MILD
HOOS JR RAW SCORE: 15
WALKING ON UNEVEN SURFACE: MILD
GOING UP OR DOWN STAIRS: SEVERE
IMPORTED FORM: YES
LYING IN BED (TURNING OVER, MAINTAINING HIP POSITION): SEVERE
RISING FROM SITTING: MODERATE
HOOS JR RAW SCORE: 4
IMPORTED HOOS JR SCORE: 100.0
IMPORTED LATERALITY: RIGHT
WALKING ON UNEVEN SURFACE: SEVERE
HOOS JR RAW SCORE: 15
IMPORTED FORM: YES
RISING FROM SITTING: MILD

## 2023-10-18 ENCOUNTER — TRANSCRIPTION ENCOUNTER (OUTPATIENT)
Age: 68
End: 2023-10-18

## 2023-10-19 ENCOUNTER — TRANSCRIPTION ENCOUNTER (OUTPATIENT)
Age: 68
End: 2023-10-19

## 2023-10-23 ENCOUNTER — APPOINTMENT (OUTPATIENT)
Dept: INTERNAL MEDICINE | Facility: CLINIC | Age: 68
End: 2023-10-23
Payer: MEDICARE

## 2023-10-23 VITALS
HEART RATE: 80 BPM | OXYGEN SATURATION: 97 % | DIASTOLIC BLOOD PRESSURE: 72 MMHG | TEMPERATURE: 97.7 F | BODY MASS INDEX: 28.17 KG/M2 | WEIGHT: 165 LBS | SYSTOLIC BLOOD PRESSURE: 107 MMHG | HEIGHT: 64 IN | RESPIRATION RATE: 16 BRPM

## 2023-10-23 DIAGNOSIS — Z01.818 ENCOUNTER FOR OTHER PREPROCEDURAL EXAMINATION: ICD-10-CM

## 2023-10-23 PROCEDURE — 99214 OFFICE O/P EST MOD 30 MIN: CPT

## 2023-10-30 ENCOUNTER — RX RENEWAL (OUTPATIENT)
Age: 68
End: 2023-10-30

## 2023-11-05 ENCOUNTER — NON-APPOINTMENT (OUTPATIENT)
Age: 68
End: 2023-11-05

## 2023-12-05 ENCOUNTER — RX RENEWAL (OUTPATIENT)
Age: 68
End: 2023-12-05

## 2023-12-05 RX ORDER — DEXLANSOPRAZOLE 30 MG/1
30 CAPSULE, DELAYED RELEASE ORAL DAILY
Qty: 90 | Refills: 3 | Status: ACTIVE | COMMUNITY
Start: 2023-12-05 | End: 1900-01-01

## 2024-01-10 ENCOUNTER — APPOINTMENT (OUTPATIENT)
Dept: INTERNAL MEDICINE | Facility: CLINIC | Age: 69
End: 2024-01-10
Payer: MEDICARE

## 2024-01-10 VITALS
RESPIRATION RATE: 16 BRPM | OXYGEN SATURATION: 98 % | HEIGHT: 64 IN | BODY MASS INDEX: 27.31 KG/M2 | SYSTOLIC BLOOD PRESSURE: 106 MMHG | HEART RATE: 101 BPM | TEMPERATURE: 98.1 F | DIASTOLIC BLOOD PRESSURE: 70 MMHG | WEIGHT: 160 LBS

## 2024-01-10 DIAGNOSIS — R11.10 VOMITING, UNSPECIFIED: ICD-10-CM

## 2024-01-10 DIAGNOSIS — R11.0 NAUSEA: ICD-10-CM

## 2024-01-10 DIAGNOSIS — R19.7 VOMITING, UNSPECIFIED: ICD-10-CM

## 2024-01-10 PROCEDURE — 99214 OFFICE O/P EST MOD 30 MIN: CPT

## 2024-01-10 RX ORDER — ONDANSETRON 4 MG/1
4 TABLET ORAL
Qty: 30 | Refills: 1 | Status: ACTIVE | COMMUNITY
Start: 2024-01-10 | End: 1900-01-01

## 2024-01-10 NOTE — PLAN
[FreeTextEntry1] : Pt appears to have GI virus  Pt will start Zofran and start electrolyte fluids and bland diet. If still cant tolerated with Zofran pt will go to ED

## 2024-01-10 NOTE — PHYSICAL EXAM
[Normal] : soft, non-tender, non-distended, no masses palpated, no HSM and normal bowel sounds [de-identified] : dry oral mucosa  [de-identified] : poor skin turgor

## 2024-01-10 NOTE — HISTORY OF PRESENT ILLNESS
[FreeTextEntry8] : Pt presents to the office today after having diarrhea N/V that started on Sunday. She has been drinking water only and avoiding food as she vomits when eating.  Pt is alert and oriented x3

## 2024-01-11 ENCOUNTER — TRANSCRIPTION ENCOUNTER (OUTPATIENT)
Age: 69
End: 2024-01-11

## 2024-01-11 DIAGNOSIS — R19.7 DIARRHEA, UNSPECIFIED: ICD-10-CM

## 2024-01-14 ENCOUNTER — EMERGENCY (EMERGENCY)
Facility: HOSPITAL | Age: 69
LOS: 1 days | Discharge: HOME | End: 2024-01-14
Attending: EMERGENCY MEDICINE | Admitting: EMERGENCY MEDICINE
Payer: MEDICARE

## 2024-01-14 VITALS
RESPIRATION RATE: 16 BRPM | WEIGHT: 160.06 LBS | SYSTOLIC BLOOD PRESSURE: 146 MMHG | HEART RATE: 88 BPM | TEMPERATURE: 98 F | HEIGHT: 64 IN | OXYGEN SATURATION: 98 % | DIASTOLIC BLOOD PRESSURE: 66 MMHG

## 2024-01-14 VITALS
OXYGEN SATURATION: 99 % | HEART RATE: 88 BPM | RESPIRATION RATE: 17 BRPM | SYSTOLIC BLOOD PRESSURE: 100 MMHG | TEMPERATURE: 98 F | DIASTOLIC BLOOD PRESSURE: 63 MMHG

## 2024-01-14 LAB
ALBUMIN SERPL ELPH-MCNC: 2.6 G/DL — LOW (ref 3.3–5)
ALBUMIN SERPL ELPH-MCNC: 2.6 G/DL — LOW (ref 3.3–5)
ALP SERPL-CCNC: 91 U/L — SIGNIFICANT CHANGE UP (ref 40–120)
ALP SERPL-CCNC: 91 U/L — SIGNIFICANT CHANGE UP (ref 40–120)
ALT FLD-CCNC: 13 U/L — SIGNIFICANT CHANGE UP (ref 12–78)
ALT FLD-CCNC: 13 U/L — SIGNIFICANT CHANGE UP (ref 12–78)
ANION GAP SERPL CALC-SCNC: 2 MMOL/L — LOW (ref 5–17)
ANION GAP SERPL CALC-SCNC: 2 MMOL/L — LOW (ref 5–17)
APPEARANCE UR: CLEAR — SIGNIFICANT CHANGE UP
APPEARANCE UR: CLEAR — SIGNIFICANT CHANGE UP
AST SERPL-CCNC: 13 U/L — LOW (ref 15–37)
AST SERPL-CCNC: 13 U/L — LOW (ref 15–37)
BASOPHILS # BLD AUTO: 0.07 K/UL — SIGNIFICANT CHANGE UP (ref 0–0.2)
BASOPHILS # BLD AUTO: 0.07 K/UL — SIGNIFICANT CHANGE UP (ref 0–0.2)
BASOPHILS NFR BLD AUTO: 0.7 % — SIGNIFICANT CHANGE UP (ref 0–2)
BASOPHILS NFR BLD AUTO: 0.7 % — SIGNIFICANT CHANGE UP (ref 0–2)
BILIRUB SERPL-MCNC: 0.3 MG/DL — SIGNIFICANT CHANGE UP (ref 0.2–1.2)
BILIRUB SERPL-MCNC: 0.3 MG/DL — SIGNIFICANT CHANGE UP (ref 0.2–1.2)
BILIRUB UR-MCNC: NEGATIVE — SIGNIFICANT CHANGE UP
BILIRUB UR-MCNC: NEGATIVE — SIGNIFICANT CHANGE UP
BUN SERPL-MCNC: 8 MG/DL — SIGNIFICANT CHANGE UP (ref 7–23)
BUN SERPL-MCNC: 8 MG/DL — SIGNIFICANT CHANGE UP (ref 7–23)
CALCIUM SERPL-MCNC: 8.5 MG/DL — SIGNIFICANT CHANGE UP (ref 8.5–10.1)
CALCIUM SERPL-MCNC: 8.5 MG/DL — SIGNIFICANT CHANGE UP (ref 8.5–10.1)
CHLORIDE SERPL-SCNC: 108 MMOL/L — SIGNIFICANT CHANGE UP (ref 96–108)
CHLORIDE SERPL-SCNC: 108 MMOL/L — SIGNIFICANT CHANGE UP (ref 96–108)
CO2 SERPL-SCNC: 28 MMOL/L — SIGNIFICANT CHANGE UP (ref 22–31)
CO2 SERPL-SCNC: 28 MMOL/L — SIGNIFICANT CHANGE UP (ref 22–31)
COLOR SPEC: YELLOW — SIGNIFICANT CHANGE UP
COLOR SPEC: YELLOW — SIGNIFICANT CHANGE UP
CREAT SERPL-MCNC: 0.6 MG/DL — SIGNIFICANT CHANGE UP (ref 0.5–1.3)
CREAT SERPL-MCNC: 0.6 MG/DL — SIGNIFICANT CHANGE UP (ref 0.5–1.3)
DIFF PNL FLD: NEGATIVE — SIGNIFICANT CHANGE UP
DIFF PNL FLD: NEGATIVE — SIGNIFICANT CHANGE UP
EGFR: 98 ML/MIN/1.73M2 — SIGNIFICANT CHANGE UP
EGFR: 98 ML/MIN/1.73M2 — SIGNIFICANT CHANGE UP
EOSINOPHIL # BLD AUTO: 0.07 K/UL — SIGNIFICANT CHANGE UP (ref 0–0.5)
EOSINOPHIL # BLD AUTO: 0.07 K/UL — SIGNIFICANT CHANGE UP (ref 0–0.5)
EOSINOPHIL NFR BLD AUTO: 0.7 % — SIGNIFICANT CHANGE UP (ref 0–6)
EOSINOPHIL NFR BLD AUTO: 0.7 % — SIGNIFICANT CHANGE UP (ref 0–6)
GLUCOSE SERPL-MCNC: 88 MG/DL — SIGNIFICANT CHANGE UP (ref 70–99)
GLUCOSE SERPL-MCNC: 88 MG/DL — SIGNIFICANT CHANGE UP (ref 70–99)
GLUCOSE UR QL: NEGATIVE MG/DL — SIGNIFICANT CHANGE UP
GLUCOSE UR QL: NEGATIVE MG/DL — SIGNIFICANT CHANGE UP
HCT VFR BLD CALC: 36.4 % — SIGNIFICANT CHANGE UP (ref 34.5–45)
HCT VFR BLD CALC: 36.4 % — SIGNIFICANT CHANGE UP (ref 34.5–45)
HGB BLD-MCNC: 12 G/DL — SIGNIFICANT CHANGE UP (ref 11.5–15.5)
HGB BLD-MCNC: 12 G/DL — SIGNIFICANT CHANGE UP (ref 11.5–15.5)
IMM GRANULOCYTES NFR BLD AUTO: 0.5 % — SIGNIFICANT CHANGE UP (ref 0–0.9)
IMM GRANULOCYTES NFR BLD AUTO: 0.5 % — SIGNIFICANT CHANGE UP (ref 0–0.9)
KETONES UR-MCNC: 15 MG/DL
KETONES UR-MCNC: 15 MG/DL
LEUKOCYTE ESTERASE UR-ACNC: ABNORMAL
LEUKOCYTE ESTERASE UR-ACNC: ABNORMAL
LIDOCAIN IGE QN: 36 U/L — SIGNIFICANT CHANGE UP (ref 13–75)
LIDOCAIN IGE QN: 36 U/L — SIGNIFICANT CHANGE UP (ref 13–75)
LYMPHOCYTES # BLD AUTO: 1.02 K/UL — SIGNIFICANT CHANGE UP (ref 1–3.3)
LYMPHOCYTES # BLD AUTO: 1.02 K/UL — SIGNIFICANT CHANGE UP (ref 1–3.3)
LYMPHOCYTES # BLD AUTO: 10.7 % — LOW (ref 13–44)
LYMPHOCYTES # BLD AUTO: 10.7 % — LOW (ref 13–44)
MAGNESIUM SERPL-MCNC: 1.7 MG/DL — SIGNIFICANT CHANGE UP (ref 1.6–2.6)
MAGNESIUM SERPL-MCNC: 1.7 MG/DL — SIGNIFICANT CHANGE UP (ref 1.6–2.6)
MCHC RBC-ENTMCNC: 28.8 PG — SIGNIFICANT CHANGE UP (ref 27–34)
MCHC RBC-ENTMCNC: 28.8 PG — SIGNIFICANT CHANGE UP (ref 27–34)
MCHC RBC-ENTMCNC: 33 GM/DL — SIGNIFICANT CHANGE UP (ref 32–36)
MCHC RBC-ENTMCNC: 33 GM/DL — SIGNIFICANT CHANGE UP (ref 32–36)
MCV RBC AUTO: 87.3 FL — SIGNIFICANT CHANGE UP (ref 80–100)
MCV RBC AUTO: 87.3 FL — SIGNIFICANT CHANGE UP (ref 80–100)
MONOCYTES # BLD AUTO: 1.04 K/UL — HIGH (ref 0–0.9)
MONOCYTES # BLD AUTO: 1.04 K/UL — HIGH (ref 0–0.9)
MONOCYTES NFR BLD AUTO: 10.9 % — SIGNIFICANT CHANGE UP (ref 2–14)
MONOCYTES NFR BLD AUTO: 10.9 % — SIGNIFICANT CHANGE UP (ref 2–14)
NEUTROPHILS # BLD AUTO: 7.27 K/UL — SIGNIFICANT CHANGE UP (ref 1.8–7.4)
NEUTROPHILS # BLD AUTO: 7.27 K/UL — SIGNIFICANT CHANGE UP (ref 1.8–7.4)
NEUTROPHILS NFR BLD AUTO: 76.5 % — SIGNIFICANT CHANGE UP (ref 43–77)
NEUTROPHILS NFR BLD AUTO: 76.5 % — SIGNIFICANT CHANGE UP (ref 43–77)
NITRITE UR-MCNC: NEGATIVE — SIGNIFICANT CHANGE UP
NITRITE UR-MCNC: NEGATIVE — SIGNIFICANT CHANGE UP
NRBC # BLD: 0 /100 WBCS — SIGNIFICANT CHANGE UP (ref 0–0)
NRBC # BLD: 0 /100 WBCS — SIGNIFICANT CHANGE UP (ref 0–0)
PH UR: 5 — SIGNIFICANT CHANGE UP (ref 5–8)
PH UR: 5 — SIGNIFICANT CHANGE UP (ref 5–8)
PLATELET # BLD AUTO: 265 K/UL — SIGNIFICANT CHANGE UP (ref 150–400)
PLATELET # BLD AUTO: 265 K/UL — SIGNIFICANT CHANGE UP (ref 150–400)
POTASSIUM SERPL-MCNC: 3.4 MMOL/L — LOW (ref 3.5–5.3)
POTASSIUM SERPL-MCNC: 3.4 MMOL/L — LOW (ref 3.5–5.3)
POTASSIUM SERPL-SCNC: 3.4 MMOL/L — LOW (ref 3.5–5.3)
POTASSIUM SERPL-SCNC: 3.4 MMOL/L — LOW (ref 3.5–5.3)
PROT SERPL-MCNC: 5.6 G/DL — LOW (ref 6–8.3)
PROT SERPL-MCNC: 5.6 G/DL — LOW (ref 6–8.3)
PROT UR-MCNC: NEGATIVE MG/DL — SIGNIFICANT CHANGE UP
PROT UR-MCNC: NEGATIVE MG/DL — SIGNIFICANT CHANGE UP
RAPID RVP RESULT: DETECTED
RAPID RVP RESULT: DETECTED
RBC # BLD: 4.17 M/UL — SIGNIFICANT CHANGE UP (ref 3.8–5.2)
RBC # BLD: 4.17 M/UL — SIGNIFICANT CHANGE UP (ref 3.8–5.2)
RBC # FLD: 12.3 % — SIGNIFICANT CHANGE UP (ref 10.3–14.5)
RBC # FLD: 12.3 % — SIGNIFICANT CHANGE UP (ref 10.3–14.5)
RV+EV RNA SPEC QL NAA+PROBE: DETECTED
RV+EV RNA SPEC QL NAA+PROBE: DETECTED
SARS-COV-2 RNA SPEC QL NAA+PROBE: SIGNIFICANT CHANGE UP
SARS-COV-2 RNA SPEC QL NAA+PROBE: SIGNIFICANT CHANGE UP
SODIUM SERPL-SCNC: 138 MMOL/L — SIGNIFICANT CHANGE UP (ref 135–145)
SODIUM SERPL-SCNC: 138 MMOL/L — SIGNIFICANT CHANGE UP (ref 135–145)
SP GR SPEC: 1.02 — SIGNIFICANT CHANGE UP (ref 1–1.03)
SP GR SPEC: 1.02 — SIGNIFICANT CHANGE UP (ref 1–1.03)
TROPONIN I, HIGH SENSITIVITY RESULT: 5.1 NG/L — SIGNIFICANT CHANGE UP
TROPONIN I, HIGH SENSITIVITY RESULT: 5.1 NG/L — SIGNIFICANT CHANGE UP
UROBILINOGEN FLD QL: 1 MG/DL — SIGNIFICANT CHANGE UP (ref 0.2–1)
UROBILINOGEN FLD QL: 1 MG/DL — SIGNIFICANT CHANGE UP (ref 0.2–1)
WBC # BLD: 9.52 K/UL — SIGNIFICANT CHANGE UP (ref 3.8–10.5)
WBC # BLD: 9.52 K/UL — SIGNIFICANT CHANGE UP (ref 3.8–10.5)
WBC # FLD AUTO: 9.52 K/UL — SIGNIFICANT CHANGE UP (ref 3.8–10.5)
WBC # FLD AUTO: 9.52 K/UL — SIGNIFICANT CHANGE UP (ref 3.8–10.5)

## 2024-01-14 PROCEDURE — 83690 ASSAY OF LIPASE: CPT

## 2024-01-14 PROCEDURE — 96375 TX/PRO/DX INJ NEW DRUG ADDON: CPT

## 2024-01-14 PROCEDURE — 80053 COMPREHEN METABOLIC PANEL: CPT

## 2024-01-14 PROCEDURE — 0225U NFCT DS DNA&RNA 21 SARSCOV2: CPT

## 2024-01-14 PROCEDURE — 99285 EMERGENCY DEPT VISIT HI MDM: CPT

## 2024-01-14 PROCEDURE — 93005 ELECTROCARDIOGRAM TRACING: CPT

## 2024-01-14 PROCEDURE — 81001 URINALYSIS AUTO W/SCOPE: CPT

## 2024-01-14 PROCEDURE — 83735 ASSAY OF MAGNESIUM: CPT

## 2024-01-14 PROCEDURE — 84484 ASSAY OF TROPONIN QUANT: CPT

## 2024-01-14 PROCEDURE — 87186 SC STD MICRODIL/AGAR DIL: CPT

## 2024-01-14 PROCEDURE — 87086 URINE CULTURE/COLONY COUNT: CPT

## 2024-01-14 PROCEDURE — 93010 ELECTROCARDIOGRAM REPORT: CPT

## 2024-01-14 PROCEDURE — 85025 COMPLETE CBC W/AUTO DIFF WBC: CPT

## 2024-01-14 PROCEDURE — 36415 COLL VENOUS BLD VENIPUNCTURE: CPT

## 2024-01-14 PROCEDURE — 96374 THER/PROPH/DIAG INJ IV PUSH: CPT

## 2024-01-14 PROCEDURE — 99284 EMERGENCY DEPT VISIT MOD MDM: CPT | Mod: 25

## 2024-01-14 RX ORDER — PANTOPRAZOLE SODIUM 20 MG/1
40 TABLET, DELAYED RELEASE ORAL ONCE
Refills: 0 | Status: COMPLETED | OUTPATIENT
Start: 2024-01-14 | End: 2024-01-14

## 2024-01-14 RX ORDER — SODIUM CHLORIDE 9 MG/ML
1000 INJECTION INTRAMUSCULAR; INTRAVENOUS; SUBCUTANEOUS ONCE
Refills: 0 | Status: COMPLETED | OUTPATIENT
Start: 2024-01-14 | End: 2024-01-14

## 2024-01-14 RX ORDER — SODIUM CHLORIDE 9 MG/ML
1000 INJECTION INTRAMUSCULAR; INTRAVENOUS; SUBCUTANEOUS ONCE
Refills: 0 | Status: DISCONTINUED | OUTPATIENT
Start: 2024-01-14 | End: 2024-01-17

## 2024-01-14 RX ORDER — ONDANSETRON 8 MG/1
4 TABLET, FILM COATED ORAL ONCE
Refills: 0 | Status: COMPLETED | OUTPATIENT
Start: 2024-01-14 | End: 2024-01-14

## 2024-01-14 RX ADMIN — ONDANSETRON 4 MILLIGRAM(S): 8 TABLET, FILM COATED ORAL at 10:27

## 2024-01-14 RX ADMIN — SODIUM CHLORIDE 1000 MILLILITER(S): 9 INJECTION INTRAMUSCULAR; INTRAVENOUS; SUBCUTANEOUS at 10:27

## 2024-01-14 RX ADMIN — PANTOPRAZOLE SODIUM 40 MILLIGRAM(S): 20 TABLET, DELAYED RELEASE ORAL at 10:28

## 2024-01-14 NOTE — ED ADULT NURSE NOTE - CAS TRG GEN SKIN COLOR
Anesthesia Release from PACU Note    Patient: Andrew Del Cid JrSharron    Procedure(s) Performed: Procedure(s) (LRB):  AMPUTATION-TOE (Right)    Anesthesia type: general    Post pain: Adequate analgesia    Post assessment: no apparent anesthetic complications, tolerated procedure well and no evidence of recall    Last Vitals:   Visit Vitals  /63   Pulse 73   Temp 36.6 °C (97.9 °F) (Skin)   Resp 14   Ht 6' (1.829 m)   Wt (!) 183.8 kg (405 lb 3.3 oz)   SpO2 98%   BMI 54.96 kg/m²       Post vital signs: stable    Level of consciousness: awake, alert  and oriented    Nausea/Vomiting: no nausea/no vomiting    Complications: none    Airway Patency: patent    Respiratory: unassisted, spontaneous ventilation, room air    Cardiovascular: stable and blood pressure at baseline    Hydration: euvolemic   Normal for race

## 2024-01-14 NOTE — ED PROVIDER NOTE - DIFFERENTIAL DIAGNOSIS
Differential Diagnosis Patient presenting to the emergency room with a chief complaint of nausea vomiting diarrhea.  Differential includes but not limited to viral etiology versus possible GI pathology.  Will obtain screening labs check stool cultures possible CT imaging of the abdomen pelvis.  Will hydrate and monitor.

## 2024-01-14 NOTE — ED PROVIDER NOTE - CARE PROVIDERS DIRECT ADDRESSES
[email or web address]

## 2024-01-14 NOTE — ED ADULT NURSE NOTE - OBJECTIVE STATEMENT
Pt received in bed alert and oriented with the c/o nausea, vomiting and diarrhea x 8 days. As per Md's orders IV matt placed blood specimen obtained and sent to the lab. Pt stable and meds given and tolerated well. Nursing care ongoing and safety maintained.

## 2024-01-14 NOTE — ED PROVIDER NOTE - CONSIDERATION OF ADMISSION OBSERVATION
If unable to tolerate po will require admission. Pt with overall improvement in symptoms, tolerating po, no abdominal pain at this time. Spoke with pt GI physician can be discharged with close outpatient follow up Consideration of Admission/Observation

## 2024-01-14 NOTE — ED ADULT NURSE NOTE - NSFALLUNIVINTERV_ED_ALL_ED
Bed/Stretcher in lowest position, wheels locked, appropriate side rails in place/Call bell, personal items and telephone in reach/Instruct patient to call for assistance before getting out of bed/chair/stretcher/Non-slip footwear applied when patient is off stretcher/[place] to call system/Physically safe environment - no spills, clutter or unnecessary equipment/Purposeful proactive rounding/Room/bathroom lighting operational, light cord in reach Bed/Stretcher in lowest position, wheels locked, appropriate side rails in place/Call bell, personal items and telephone in reach/Instruct patient to call for assistance before getting out of bed/chair/stretcher/Non-slip footwear applied when patient is off stretcher/[place] to call system/Physically safe environment - no spills, clutter or unnecessary equipment/Purposeful proactive rounding/Room/bathroom lighting operational, light cord in reach

## 2024-01-14 NOTE — ED PROVIDER NOTE - CARE PROVIDER_API CALL
Nadia Justice  Gastroenterology  [address] [place] [address]  Phone: [phone]  Fax: [phone]  Follow Up Time:    Nadia Justice  Gastroenterology  [address] [place] [address]  Phone: [phone]  Fax: [phone]  Follow Up Time:

## 2024-01-14 NOTE — ED PROVIDER NOTE - PATIENT PORTAL LINK FT
You can access the FollowMyHealth Patient Portal offered by [facility] by registering at the following website: [email or web address]. By joining [employer]’s FollowMyHealth portal, you will also be able to view your health information using other applications (apps) compatible with our system. You can access the FollowMyHealth Patient Portal offered by [facility] by registering at the following website: [email or web address]. By joining [employer]’s FollowMyHealth portal, you will also be able to view your health information using other applications (apps) compatible with our system.

## 2024-01-14 NOTE — ED PROVIDER NOTE - OBJECTIVE STATEMENT
Patient is a 68-year-old female who presents to the emergency room with nausea vomiting diarrhea and lightheadedness.  Past medical history of reflux, hypertension hyperlipidemia.  Patient is on Ozempic for weight loss.  She reports she has been on Ozempic for many years.  States that last Sunday she developed nausea vomiting and profuse nonbloody diarrhea with inability to tolerate p.o. symptoms continued and she followed up with her PMD on Tuesday.  She was prescribed Zofran at that time and was able to tolerate some p.o. with some mild improvement in symptoms.  Was able to go out yesterday then after returning home she again became weak.  She reports continued diarrhea and states that every time she attempts to urinate the stool just "pours out".  Denies headache fever chest pain or shortness of breath.  She had a prior colonoscopy 2 years ago with no known GI pathology

## 2024-01-14 NOTE — ED PROVIDER NOTE - CLINICAL SUMMARY MEDICAL DECISION MAKING FREE TEXT BOX
Patient is a 68-year-old female who presents to the emergency room with nausea vomiting diarrhea and lightheadedness.  Past medical history of reflux, hypertension hyperlipidemia.  Patient is on Ozempic for weight loss.  She reports she has been on Ozempic for many years.  States that last Sunday she developed nausea vomiting and profuse nonbloody diarrhea with inability to tolerate p.o. symptoms continued and she followed up with her PMD on Tuesday.  She was prescribed Zofran at that time and was able to tolerate some p.o. with some mild improvement in symptoms.  Was able to go out yesterday then after returning home she again became weak.  She reports continued diarrhea and states that every time she attempts to urinate the stool just "pours out".  Denies headache fever chest pain or shortness of breath.  She had a prior colonoscopy 2 years ago with no known GI pathology. Patient presenting to the emergency room with a chief complaint of nausea vomiting diarrhea.  Differential includes but not limited to viral etiology versus possible GI pathology.  Will obtain screening labs check stool cultures possible CT imaging of the abdomen pelvis.  Will hydrate and monitor. Patient is a 68-year-old female who presents to the emergency room with nausea vomiting diarrhea and lightheadedness.  Past medical history of reflux, hypertension hyperlipidemia.  Patient is on Ozempic for weight loss.  She reports she has been on Ozempic for many years.  States that last Sunday she developed nausea vomiting and profuse nonbloody diarrhea with inability to tolerate p.o. symptoms continued and she followed up with her PMD on Tuesday.  She was prescribed Zofran at that time and was able to tolerate some p.o. with some mild improvement in symptoms.  Was able to go out yesterday then after returning home she again became weak.  She reports continued diarrhea and states that every time she attempts to urinate the stool just "pours out".  Denies headache fever chest pain or shortness of breath.  She had a prior colonoscopy 2 years ago with no known GI pathology. Patient presenting to the emergency room with a chief complaint of nausea vomiting diarrhea.  Differential includes but not limited to viral etiology versus possible GI pathology.  Will obtain screening labs check stool cultures possible CT imaging of the abdomen pelvis.  Will hydrate and monitor. Results of labs reviewed patient with a normal white count no evidence of anemia potassium of 3.4 which was supplemented urine with ketones but no evidence of infection positive for rhinovirus.  Abdomen remains soft nontender nondistended.  Patient is now tolerating p.o. reports significant improvement no diarrhea while in the department urinating with clear urine at this time.  Spoke with her gastroenterologist who advised that she can be sent home with symptom control.  Patient already has Zofran.  Will discharge with outpatient symptom control.  Discussed possibly deferring her Ozempic during this time she will follow-up with Dr. Perlman in regards to this. Patient is a 68-year-old female who presents to the emergency room with nausea vomiting diarrhea and lightheadedness.  Past medical history of reflux, hypertension hyperlipidemia.  Patient is on Ozempic for weight loss.  She reports she has been on Ozempic for many years.  States that last Sunday she developed nausea vomiting and profuse nonbloody diarrhea with inability to tolerate p.o. symptoms continued and she followed up with her PMD on Tuesday.  She was prescribed Zofran at that time and was able to tolerate some p.o. with some mild improvement in symptoms.  Was able to go out yesterday then after returning home she again became weak.  She reports continued diarrhea and states that every time she attempts to urinate the stool just "pours out".  Denies headache fever chest pain or shortness of breath.  She had a prior colonoscopy 2 years ago with no known GI pathology. Patient presenting to the emergency room with a chief complaint of nausea vomiting diarrhea.  Differential includes but not limited to viral etiology versus possible GI pathology.  Will obtain screening labs check stool cultures possible CT imaging of the abdomen pelvis.  Will hydrate and monitor. Results of labs reviewed patient with a normal white count no evidence of anemia potassium of 3.4 which was supplemented urine with ketones but no evidence of infection positive for rhinovirus.  Abdomen remains soft nontender nondistended.  Patient is now tolerating p.o. reports significant improvement no diarrhea while in the department urinating with clear urine at this time.  Spoke with her gastroenterologist who advised that she can be sent home with symptom control.  Patient already has Zofran.  Will discharge with outpatient symptom control.  Discussed possibly deferring her Ozempic during this time she will follow-up with Dr. Perlman in regards to this. Independent review of EKG reveals a normal sinus rhythm at 82 bpm

## 2024-01-14 NOTE — ED PROVIDER NOTE - NSFOLLOWUPINSTRUCTIONS_ED_ALL_ED_FT
Return to the ED for any new or worsening symptoms  Take your medication as prescribed  Clear liquids advance as tolerated  Follow-up with the gastroenterologist call tomorrow to schedule outpatient follow-up  Advance activity as tolerated  Consider speaking with Dr. Perlman about possibly decreasing or stopping your Ozempic during this time.      Viral Gastroenteritis, Adult  Body outline showing the digestive tract, including the stomach, small intestine, and large intestine.  Viral gastroenteritis is also known as the stomach flu. This condition may affect your stomach, small intestine, and large intestine. It can cause sudden watery diarrhea, fever, and vomiting. This condition is caused by many different viruses. These viruses can be passed from person to person very easily (are contagious).    Diarrhea and vomiting can make you feel weak and cause you to become dehydrated. You may not be able to keep fluids down. Dehydration can make you tired and thirsty, cause you to have a dry mouth, and decrease how often you urinate. It is important to replace the fluids that you lose from diarrhea and vomiting.    What are the causes?  Gastroenteritis is caused by many viruses, including rotavirus and norovirus. Norovirus is the most common cause in adults. You can get sick after being exposed to the viruses from other people. You can also get sick by:  Eating food, drinking water, or touching a surface contaminated with one of these viruses.  Sharing utensils or other personal items with an infected person.  What increases the risk?  You are more likely to develop this condition if you:  Have a weak body defense system (immune system).  Live with one or more children who are younger than 2 years.  Live in a nursing home.  Travel on cruise ships.  What are the signs or symptoms?  Symptoms of this condition start suddenly 1–3 days after exposure to a virus. Symptoms may last for a few days or for as long as a week. Common symptoms include watery diarrhea and vomiting. Other symptoms include:  Fever.  Headache.  Fatigue.  Pain in the abdomen.  Chills.  Weakness.  Nausea.  Muscle aches.  Loss of appetite.  How is this diagnosed?  This condition is diagnosed with a medical history and physical exam. You may also have a stool test to check for viruses or other infections.    How is this treated?  This condition typically goes away on its own. The focus of treatment is to prevent dehydration and restore lost fluids (rehydration). This condition may be treated with:  An oral rehydration solution (ORS) to replace important salts and minerals (electrolytes) in your body. Take this if told by your health care provider. This is a drink that is sold at pharmacies and retail stores.  Medicines to help with your symptoms.  Probiotic supplements to reduce symptoms of diarrhea.  Fluids given through an IV, if dehydration is severe.  Older adults and people with other diseases or a weak immune system are at higher risk for dehydration.    Follow these instructions at home:  Eating and drinking    A comparison of three sample cups showing dark yellow, yellow, and pale yellow urine.  Take an ORS as told by your health care provider.  Drink clear fluids in small amounts as you are able. Clear fluids include:  Water.  Ice chips.  Diluted fruit juice.  Low-calorie sports drinks.  Drink enough fluid to keep your urine pale yellow.  Eat small amounts of healthy foods every 3–4 hours as you are able. This may include whole grains, fruits, vegetables, lean meats, and yogurt.  Avoid fluids that contain a lot of sugar or caffeine, such as energy drinks, sports drinks, and soda.  Avoid spicy or fatty foods.  Avoid alcohol.  General instructions    Washing hands with soap and water.  Wash your hands often, especially after having diarrhea or vomiting. If soap and water are not available, use hand .  Make sure that all people in your household wash their hands well and often.  Take over-the-counter and prescription medicines only as told by your health care provider.  Rest at home while you recover.  Watch your condition for any changes.  Take a warm bath to relieve any burning or pain from frequent diarrhea episodes.  Keep all follow-up visits. This is important.  Contact a health care provider if you:  Cannot keep fluids down.  Have symptoms that get worse.  Have new symptoms.  Feel light-headed or dizzy.  Have muscle cramps.  Get help right away if you:  Have chest pain.  Have trouble breathing or you are breathing very quickly.  Have a fast heartbeat.  Feel extremely weak or you faint.  Have a severe headache, a stiff neck, or both.  Have a rash.  Have severe pain, cramping, or bloating in your abdomen.  Have skin that feels cold and clammy.  Feel confused.  Have pain when you urinate.  Have signs of dehydration, such as:  Dark urine, very little urine, or no urine.  Cracked lips.  Dry mouth.  Sunken eyes.  Sleepiness.  Weakness.  Have signs of bleeding, such as:  Seeing blood in your vomit.  Having vomit that looks like coffee grounds.  Having bloody or black stools or stools that look like tar.  These symptoms may be an emergency. Get help right away. Call 911.  Do not wait to see if the symptoms will go away.  Do not drive yourself to the hospital.  Summary  Viral gastroenteritis is also known as the stomach flu. It can cause sudden watery diarrhea, fever, and vomiting.  This condition can be passed from person to person very easily (is contagious).  Take an oral rehydration solution (ORS) if told by your health care provider. This is a drink that is sold at pharmacies and retail stores.  Wash your hands often, especially after having diarrhea or vomiting. If soap and water are not available, use hand .  This information is not intended to replace advice given to you by your health care provider. Make sure you discuss any questions you have with your health care provider.

## 2024-01-14 NOTE — ED PROVIDER NOTE - CARE PLAN
Principal Discharge DX:	Gastroenteritis   1 Principal Discharge DX:	Gastroenteritis  Secondary Diagnosis:	Enterovirus infection

## 2024-01-14 NOTE — ED ADULT TRIAGE NOTE - CHIEF COMPLAINT QUOTE
upper lip externally swollen, no internal swelling, no oropharyngeal edema. No uvular edema. upper lip externally swollen, no internal swelling, no oropharyngeal edema. No uvular edema.    RUE: cap refill <2s, sensation intact, motor intact, diffusely swollen hand to elbow nausea, vomiting diarrhea *1 week, abdominal pain on getting nauseous

## 2024-01-15 ENCOUNTER — TRANSCRIPTION ENCOUNTER (OUTPATIENT)
Age: 69
End: 2024-01-15

## 2024-01-18 ENCOUNTER — TRANSCRIPTION ENCOUNTER (OUTPATIENT)
Age: 69
End: 2024-01-18

## 2024-01-22 ENCOUNTER — OFFICE (OUTPATIENT)
Dept: URBAN - METROPOLITAN AREA CLINIC 109 | Facility: CLINIC | Age: 69
Setting detail: OPHTHALMOLOGY
End: 2024-01-22
Payer: MEDICARE

## 2024-01-22 DIAGNOSIS — H25.13: ICD-10-CM

## 2024-01-22 PROCEDURE — 99213 OFFICE O/P EST LOW 20 MIN: CPT | Performed by: OPHTHALMOLOGY

## 2024-01-22 ASSESSMENT — REFRACTION_CURRENTRX
OD_OVR_VA: 20/
OS_SPHERE: -0.50
OS_OVR_VA: 20/
OS_CYLINDER: SPH
OD_OVR_VA: 20/
OS_OVR_VA: 20/
OS_AXIS: 00
OD_SPHERE: +1.75
OS_OVR_VA: 20/
OD_SPHERE: -0.50
OS_VPRISM_DIRECTION: SV
OD_CYLINDER: SPH
OS_SPHERE: +2.00
OD_AXIS: 00
OD_SPHERE: +2.00
OS_SPHERE: +1.75
OD_OVR_VA: 20/
OD_VPRISM_DIRECTION: SV

## 2024-01-22 ASSESSMENT — REFRACTION_AUTOREFRACTION
OS_CYLINDER: -0.50
OD_SPHERE: +0.25
OS_AXIS: 146
OS_SPHERE: 0.00
OD_CYLINDER: -0.25
OD_AXIS: 066

## 2024-01-22 ASSESSMENT — CONFRONTATIONAL VISUAL FIELD TEST (CVF)
OD_FINDINGS: FULL
OS_FINDINGS: FULL

## 2024-01-22 ASSESSMENT — SPHEQUIV_DERIVED
OD_SPHEQUIV: 0.125
OS_SPHEQUIV: -0.25

## 2024-02-01 ENCOUNTER — NON-APPOINTMENT (OUTPATIENT)
Age: 69
End: 2024-02-01

## 2024-02-05 ENCOUNTER — APPOINTMENT (OUTPATIENT)
Dept: INTERNAL MEDICINE | Facility: CLINIC | Age: 69
End: 2024-02-05
Payer: MEDICARE

## 2024-02-05 VITALS
SYSTOLIC BLOOD PRESSURE: 114 MMHG | BODY MASS INDEX: 27.66 KG/M2 | DIASTOLIC BLOOD PRESSURE: 69 MMHG | HEART RATE: 87 BPM | RESPIRATION RATE: 16 BRPM | TEMPERATURE: 99.3 F | OXYGEN SATURATION: 99 % | WEIGHT: 162 LBS | HEIGHT: 64 IN

## 2024-02-05 DIAGNOSIS — B34.9 VIRAL INFECTION, UNSPECIFIED: ICD-10-CM

## 2024-02-05 DIAGNOSIS — J31.0 CHRONIC RHINITIS: ICD-10-CM

## 2024-02-05 PROCEDURE — 99214 OFFICE O/P EST MOD 30 MIN: CPT

## 2024-02-05 PROCEDURE — G2211 COMPLEX E/M VISIT ADD ON: CPT

## 2024-02-05 RX ORDER — PREDNISONE 10 MG/1
10 TABLET ORAL
Qty: 10 | Refills: 0 | Status: ACTIVE | COMMUNITY
Start: 2024-02-05 | End: 1900-01-01

## 2024-02-05 RX ORDER — FLUTICASONE PROPIONATE 50 UG/1
50 SPRAY, METERED NASAL
Qty: 1 | Refills: 3 | Status: ACTIVE | COMMUNITY
Start: 2024-02-05 | End: 1900-01-01

## 2024-02-05 NOTE — PHYSICAL EXAM
[Normal] : affect was normal and insight and judgment were intact [de-identified] : mild erythema of oral pharynx. Nasal mucosa erythema and swelling

## 2024-02-05 NOTE — REVIEW OF SYSTEMS
[Fever] : no fever [Chills] : no chills [Nasal Discharge] : nasal discharge [Sore Throat] : sore throat [Postnasal Drip] : postnasal drip [Negative] : Respiratory

## 2024-02-05 NOTE — HISTORY OF PRESENT ILLNESS
[FreeTextEntry8] : Seen at  Negative COVID Negative strep Throat culture is negative.  Pt still has a sore throat and congestion.

## 2024-02-05 NOTE — PLAN
[FreeTextEntry1] : COVID PCR negative in office Pt will start medications above and increase electrolyte fluids.

## 2024-02-06 ENCOUNTER — TRANSCRIPTION ENCOUNTER (OUTPATIENT)
Age: 69
End: 2024-02-06

## 2024-02-07 ENCOUNTER — TRANSCRIPTION ENCOUNTER (OUTPATIENT)
Age: 69
End: 2024-02-07

## 2024-02-07 DIAGNOSIS — R05.9 COUGH, UNSPECIFIED: ICD-10-CM

## 2024-02-07 RX ORDER — AZITHROMYCIN 250 MG/1
250 TABLET, FILM COATED ORAL
Qty: 1 | Refills: 0 | Status: ACTIVE | COMMUNITY
Start: 2024-02-07 | End: 1900-01-01

## 2024-02-22 ENCOUNTER — TRANSCRIPTION ENCOUNTER (OUTPATIENT)
Age: 69
End: 2024-02-22

## 2024-02-27 ENCOUNTER — TRANSCRIPTION ENCOUNTER (OUTPATIENT)
Age: 69
End: 2024-02-27

## 2024-02-27 DIAGNOSIS — Z12.31 ENCOUNTER FOR SCREENING MAMMOGRAM FOR MALIGNANT NEOPLASM OF BREAST: ICD-10-CM

## 2024-03-07 ENCOUNTER — TRANSCRIPTION ENCOUNTER (OUTPATIENT)
Age: 69
End: 2024-03-07

## 2024-03-08 PROBLEM — G25.0 BENIGN ESSENTIAL TREMOR: Status: ACTIVE | Noted: 2018-07-06

## 2024-03-08 PROBLEM — K22.70 BARRETT'S ESOPHAGUS WITHOUT DYSPLASIA: Status: ACTIVE | Noted: 2023-01-12

## 2024-03-08 PROBLEM — F41.9 ANXIETY: Status: ACTIVE | Noted: 2018-07-06

## 2024-03-12 ENCOUNTER — APPOINTMENT (OUTPATIENT)
Dept: GASTROENTEROLOGY | Facility: CLINIC | Age: 69
End: 2024-03-12
Payer: MEDICARE

## 2024-03-12 VITALS
DIASTOLIC BLOOD PRESSURE: 73 MMHG | WEIGHT: 166 LBS | HEIGHT: 64 IN | OXYGEN SATURATION: 97 % | HEART RATE: 82 BPM | SYSTOLIC BLOOD PRESSURE: 107 MMHG | RESPIRATION RATE: 18 BRPM | BODY MASS INDEX: 28.34 KG/M2

## 2024-03-12 DIAGNOSIS — K22.70 BARRETT'S ESOPHAGUS W/OUT DYSPLASIA: ICD-10-CM

## 2024-03-12 DIAGNOSIS — F41.9 ANXIETY DISORDER, UNSPECIFIED: ICD-10-CM

## 2024-03-12 DIAGNOSIS — G25.0 ESSENTIAL TREMOR: ICD-10-CM

## 2024-03-12 PROCEDURE — 99214 OFFICE O/P EST MOD 30 MIN: CPT

## 2024-03-12 RX ORDER — SODIUM SULFATE, MAGNESIUM SULFATE, AND POTASSIUM CHLORIDE 17.75; 2.7; 2.25 G/1; G/1; G/1
1479-225-188 TABLET ORAL
Qty: 1 | Refills: 0 | Status: ACTIVE | COMMUNITY
Start: 2024-03-12 | End: 1900-01-01

## 2024-03-12 NOTE — ASSESSMENT
[FreeTextEntry1] : IMPRESSION: #  Due for a screening test for colorectal cancer - Colonoscopy 2021 by Dr. Elisha Underwood which showed hemorrhoids and 4 mm polyp removed sigmoid colon, sigmoid diverticulosis, 8 mm polyp removed from ascending colon. 5 mm polyp removed from ascending colon. Pathology results not in chart. Repeat 2024. - She says she has a total of three colonoscopies in her life time - only has colonoscopy in 2021 polyps were found and removed. - History of rectal bleeding from symptomatic internal hemorrhoids - seen colorectal surgeon   # Short segment Hoskins's esophagus without dysplasia - Dexilant 30 mg once a day - EGD on 2022 by Dr. Justice: Small Hiatal hernia with a small tongue of salmon colored mucosa s/p bx x3(Hoskins's esophagus without dysplasia). Mild antral/body erythema s/p bx (neg for HP and IM). Nml duodenum s/p bx (neg for celiac disease).  Rpt in 5 yrs  - Upper endoscopy 2 years ago - unremarkable from what she recalls - report still not in chart - Takes baby aspirin for year.   # History of vomiting since two years - worsening since past couple of months - MRI of the abd with IV contrast on 2022: B/l renal cyst. Otherwise normal. - HIDA scan on 2022: Nml. GB EF of 96% - Abdominal ultrasound 2022: Nml - CT scan A/P on 2021: Three large diverticula of the second and third portions of duodenum. Sigmoid diverticulosis. Otherwise no evidence of acute inflammatory or obstructive process in the abd/pelvis. - Gastric emptying scan on 2020: Normal gastric emptying scan. - Also CT scan 3 or 4 years ago - No marijuana use  # Comorbidities: HTN, Hypercholesterolemia, obesity, elevated HgA1C  # Surgeries: s/p , ectopic pregnancy, endometriosis    PLAN: I have advised the patient to arrange for a colonoscopy to rule out colon polyps, colorectal cancer etc. under monitored anesthesia care.  Risks such as perforation requiring surgery, colostomy, bleeding requiring blood transfusion, infection/sepsis, diverticulitis, colitis, missed colon cancer (2% to 6%), internal organ injury such as splenic hemorrhage (6000, risk thin, female gender) etc, adverse reaction to medication etc. and risks of anesthesia including cardiopulmonary compromise requiring ICU care were discussed with patient.  Alternatives were discussed.  Patient verbalized understanding and agrees to proceed with a colonoscopy under anesthesia.   She will get records of her prior colonoscopy pathology.  If in her lifetime she has 10 or more precancerous colon lesions, then she will higher risk for colonoscopy and will enhanced screening.    Continue Dexilant 30 mg once a day, 30 min before breakfast until next upper endoscopy - in 5 years from last exam.    PPI use is associated with adverse events in all organ systems in the body - however association is not causation.  Adverse events are based on observation studies and no randomized control trials.    PPI may well be associated with increase risk for enteric infections.    PPI safety is certain in diseases of concern:  Pneumonia, Cardiovascular disease, All cause mortality, Fracture   PPI safety is less clear in diseases of concern:  Stroke, dementia, Renal disease, Inflammatory bowel disease outcome

## 2024-03-12 NOTE — PHYSICAL EXAM
[Bowel Sounds] : normal bowel sounds [Abdomen Tenderness] : non-tender [No Masses] : no abdominal mass palpated [Abdomen Soft] : soft [Cervical Lymph Nodes Enlarged Posterior Bilaterally] : no posterior cervical lymphadenopathy [Supraclavicular Lymph Nodes Enlarged Bilaterally] : no supraclavicular lymphadenopathy [No Clubbing, Cyanosis] : no clubbing or cyanosis of the fingernails [Normal Color / Pigmentation] : normal skin color and pigmentation [] : no rash [Normal] : oriented to person, place, and time

## 2024-03-12 NOTE — HISTORY OF PRESENT ILLNESS
[FreeTextEntry1] : 69 y/o mother of one with Hx of HTN, hypercholesterolemia, s/p , ectopic pregnancy, endometriosis who presents for screening colonoscopy   She says not vomiting since 5 months.  She has been gaining weight.   Sometimes constipated.  Sometimes has rectal bleeding when constipated. Takes Dexilant 30 - Patient denies having difficulty swallowing, painful swallowing.  Patient denies having abdominal pain, diarrhea, loss of appetite, weight loss, melena and hematochezia.     Initial of office visit in 2021: Dr. Stern was her prior GI. She recalls vomiting bile in the past - had upper endoscopy two years ago - was recommended to take Dexilant 60 mg once day - she continues to take Dexilant. She was recommended to ween off by her PCP. She says she has acid reflux - belching, regurgitation, burning - a couple of times a week - it has gotten better with Dexilant since two years. No dysphagia, no odynophagia.  Get nauseas once a month. IN the past vomiting more regularly but that seems to have resolved a a couple of months ago. However she vomited two weeks. Vomiting would usually occur at 2 or 3 AM - no abdominal associated with vomiting. Still needing Zofran ever now and then. No blood in vomit. No loss of appetite, no weight loss. She says she a lot of rectal bleeding - she took hemorrhoidal creams and is feel better. At times has difficulty having a bowel movement - feels constipated - hurts to have a bowel movement at times in the rectum. Takes Benefiber. Gags with Metamucil. bowel movement have been over getting better. No melena. Blood count on 2021 - normal H/H. Seen Dr. Ramirez on 2021 - anoscopy revealed grade 2 internal hemorrhoids.  She says she has a total of three colonoscopies - only her last colonoscopy in 3/3021 polyps were removed. Colonoscopy 3/2021 by Dr. Elisha Underwood which showed hemorrhoids and 4 mm polyp removed sigmoid colon, sigmoid diverticulosis, 8 mm polyp removed from ascending colon. 5 mm polyp removed from ascending colon. Repeat colonoscopy in 3 years.   Gastric emptying scan on 2020: Normal gastric emptying scan. She recalls have two CT scans for vomiting - last CT 3 or 4 years ago. Patient denies family history of GI cancers.  Discussion/Summary 2021: called patient and reviewed recent CT scan - diverticulosis without diverticulitis - patient reports feeling well - but feeling constipated - she will try MiraLAX once a day and follow up with me.  Discussion/Summary 2022: She says she has been vomiting again - on an off - wondering about her GB - no other complaints - will order ultrasound - advised going back on Dexilant for 3 to 4 weeks and follow up with me -.  Office visit 2022: She has been having rectal bleeding - on Friday she had more rectal bleeding "a lot of red blood". Continues to have "a lot of red blood". She got nervous. Offers no other complaints. No longer taking Dexilant. No longer with vomiting.   Office visit 10/2022: Vomiting recurred - in the past once in while Take famotidine - at times takes it twice a day. On worse day will take 3 H2 Blocker (but rare). Last week worse, every night. dyspepsia, and burning in midback. gets nausea, liquid vomit No loss of appetite, but afraid to eat certain foods. Since July she has lost 5 lbs - says she walks, takes Ozempic for weight loss. She has lost 90 lbs since almost 4 years. Patient denies having difficulty swallowing, painful swallowing, nausea, vomiting, melena and hematochezia.    Office visit 2022:  Still vomiting, feels week, epigastric pain - radiates to the back - burning pain. She says she just vomits lots of bile - 5 days of the week she will vomit.  No marijuana. She says worries and her  worries - she is worried about cancer. She says people around her say that she is loosing weight - "are you OK". Says she feels so drained.  says he had test to evaluate his GB function and is considering if his wife can the test. She is no longer taking Dexilant - she says even with the Dexilant she was vomiting. Says last couple of months vomiting worse.   Office visit 2023:  She is feeling better - no longer with vomiting - it has lessened. Says no longer loosing weight. No longer with pain since taking Dexilant 30 mg once a day - also takes Famotidine at night.

## 2024-03-14 ENCOUNTER — RX RENEWAL (OUTPATIENT)
Age: 69
End: 2024-03-14

## 2024-03-14 DIAGNOSIS — Z12.11 ENCOUNTER FOR SCREENING FOR MALIGNANT NEOPLASM OF COLON: ICD-10-CM

## 2024-03-14 DIAGNOSIS — Z12.12 ENCOUNTER FOR SCREENING FOR MALIGNANT NEOPLASM OF COLON: ICD-10-CM

## 2024-03-14 RX ORDER — FLUOXETINE HYDROCHLORIDE 10 MG/1
10 TABLET ORAL
Qty: 90 | Refills: 1 | Status: ACTIVE | COMMUNITY
Start: 2023-03-03 | End: 1900-01-01

## 2024-03-21 ENCOUNTER — RX RENEWAL (OUTPATIENT)
Age: 69
End: 2024-03-21

## 2024-03-21 RX ORDER — ATENOLOL 50 MG/1
50 TABLET ORAL
Qty: 90 | Refills: 0 | Status: ACTIVE | COMMUNITY
Start: 2018-07-06 | End: 1900-01-01

## 2024-03-22 DIAGNOSIS — E78.5 HYPERLIPIDEMIA, UNSPECIFIED: ICD-10-CM

## 2024-03-22 RX ORDER — ROSUVASTATIN CALCIUM 20 MG/1
20 TABLET, FILM COATED ORAL
Qty: 90 | Refills: 0 | Status: ACTIVE | COMMUNITY
Start: 2020-08-31 | End: 1900-01-01

## 2024-04-23 ENCOUNTER — APPOINTMENT (OUTPATIENT)
Dept: INTERNAL MEDICINE | Facility: CLINIC | Age: 69
End: 2024-04-23

## 2024-04-23 VITALS
BODY MASS INDEX: 28.17 KG/M2 | DIASTOLIC BLOOD PRESSURE: 78 MMHG | RESPIRATION RATE: 16 BRPM | OXYGEN SATURATION: 98 % | WEIGHT: 165 LBS | HEIGHT: 64 IN | SYSTOLIC BLOOD PRESSURE: 122 MMHG | HEART RATE: 74 BPM | TEMPERATURE: 98 F

## 2024-04-29 ENCOUNTER — APPOINTMENT (OUTPATIENT)
Dept: INTERNAL MEDICINE | Facility: CLINIC | Age: 69
End: 2024-04-29
Payer: MEDICARE

## 2024-04-29 VITALS
RESPIRATION RATE: 16 BRPM | BODY MASS INDEX: 28.17 KG/M2 | TEMPERATURE: 98.3 F | OXYGEN SATURATION: 96 % | SYSTOLIC BLOOD PRESSURE: 109 MMHG | DIASTOLIC BLOOD PRESSURE: 71 MMHG | WEIGHT: 165 LBS | HEIGHT: 64 IN | HEART RATE: 85 BPM

## 2024-04-29 DIAGNOSIS — E55.9 VITAMIN D DEFICIENCY, UNSPECIFIED: ICD-10-CM

## 2024-04-29 DIAGNOSIS — I10 ESSENTIAL (PRIMARY) HYPERTENSION: ICD-10-CM

## 2024-04-29 LAB
25(OH)D3 SERPL-MCNC: 38.2 NG/ML
ALBUMIN SERPL ELPH-MCNC: 4.2 G/DL
ALP BLD-CCNC: 117 U/L
ALT SERPL-CCNC: 11 U/L
ANION GAP SERPL CALC-SCNC: 16 MMOL/L
AST SERPL-CCNC: 15 U/L
BASOPHILS # BLD AUTO: 0.05 K/UL
BASOPHILS NFR BLD AUTO: 0.8 %
BILIRUB SERPL-MCNC: 0.5 MG/DL
BUN SERPL-MCNC: 12 MG/DL
CALCIUM SERPL-MCNC: 9.3 MG/DL
CHLORIDE SERPL-SCNC: 104 MMOL/L
CHOLEST SERPL-MCNC: 197 MG/DL
CO2 SERPL-SCNC: 23 MMOL/L
CREAT SERPL-MCNC: 0.76 MG/DL
EGFR: 85 ML/MIN/1.73M2
EOSINOPHIL # BLD AUTO: 0.09 K/UL
EOSINOPHIL NFR BLD AUTO: 1.5 %
ESTIMATED AVERAGE GLUCOSE: 103 MG/DL
GLUCOSE SERPL-MCNC: 96 MG/DL
HBA1C MFR BLD HPLC: 5.2 %
HCT VFR BLD CALC: 41.3 %
HDLC SERPL-MCNC: 75 MG/DL
HGB BLD-MCNC: 13.6 G/DL
IMM GRANULOCYTES NFR BLD AUTO: 0.2 %
LDLC SERPL CALC-MCNC: 113 MG/DL
LYMPHOCYTES # BLD AUTO: 0.91 K/UL
LYMPHOCYTES NFR BLD AUTO: 14.7 %
MAN DIFF?: NORMAL
MCHC RBC-ENTMCNC: 29.2 PG
MCHC RBC-ENTMCNC: 32.9 GM/DL
MCV RBC AUTO: 88.8 FL
MONOCYTES # BLD AUTO: 0.52 K/UL
MONOCYTES NFR BLD AUTO: 8.4 %
NEUTROPHILS # BLD AUTO: 4.59 K/UL
NEUTROPHILS NFR BLD AUTO: 74.4 %
NONHDLC SERPL-MCNC: 122 MG/DL
PLATELET # BLD AUTO: 250 K/UL
POTASSIUM SERPL-SCNC: 5.1 MMOL/L
PROT SERPL-MCNC: 6.2 G/DL
RBC # BLD: 4.65 M/UL
RBC # FLD: 14.3 %
SODIUM SERPL-SCNC: 143 MMOL/L
TRIGL SERPL-MCNC: 43 MG/DL
WBC # FLD AUTO: 6.17 K/UL

## 2024-04-29 PROCEDURE — 99214 OFFICE O/P EST MOD 30 MIN: CPT

## 2024-04-29 PROCEDURE — G2211 COMPLEX E/M VISIT ADD ON: CPT

## 2024-04-29 NOTE — PLAN
[FreeTextEntry1] : Patient will continue with current medications Reviewed fasting blood work with patient LDL increased slightly further improvement with diet and exercise is needed Patient will follow-up again with me in 6 months or before then if needed

## 2024-04-29 NOTE — HISTORY OF PRESENT ILLNESS
[de-identified] : Patient presents the office today for follow-up and review of fasting blood work Patient has not been exercising diet has been fair but could do some improvement

## 2024-05-30 ENCOUNTER — TRANSCRIPTION ENCOUNTER (OUTPATIENT)
Age: 69
End: 2024-05-30

## 2024-06-05 ENCOUNTER — APPOINTMENT (OUTPATIENT)
Dept: GASTROENTEROLOGY | Facility: HOSPITAL | Age: 69
End: 2024-06-05

## 2024-06-05 ENCOUNTER — OUTPATIENT (OUTPATIENT)
Dept: OUTPATIENT SERVICES | Facility: HOSPITAL | Age: 69
LOS: 1 days | End: 2024-06-05
Payer: MEDICARE

## 2024-06-05 DIAGNOSIS — Z12.11 ENCOUNTER FOR SCREENING FOR MALIGNANT NEOPLASM OF COLON: ICD-10-CM

## 2024-06-05 DIAGNOSIS — Z86.010 PERSONAL HISTORY OF COLONIC POLYPS: ICD-10-CM

## 2024-06-05 PROCEDURE — G0104: CPT

## 2024-06-05 PROCEDURE — 45330 DIAGNOSTIC SIGMOIDOSCOPY: CPT

## 2024-06-05 DEVICE — HEMOSPRAY HEMOSTAT ENDO 7F: Type: IMPLANTABLE DEVICE | Status: FUNCTIONAL

## 2024-06-05 DEVICE — CLIP RESOLUTION 360 235CM: Type: IMPLANTABLE DEVICE | Status: FUNCTIONAL

## 2024-06-12 ENCOUNTER — APPOINTMENT (OUTPATIENT)
Dept: INTERNAL MEDICINE | Facility: CLINIC | Age: 69
End: 2024-06-12
Payer: MEDICARE

## 2024-06-12 VITALS
OXYGEN SATURATION: 96 % | HEART RATE: 81 BPM | TEMPERATURE: 98.2 F | RESPIRATION RATE: 14 BRPM | SYSTOLIC BLOOD PRESSURE: 120 MMHG | BODY MASS INDEX: 28.17 KG/M2 | HEIGHT: 64 IN | WEIGHT: 165 LBS | DIASTOLIC BLOOD PRESSURE: 78 MMHG

## 2024-06-12 DIAGNOSIS — B02.9 ZOSTER W/OUT COMPLICATIONS: ICD-10-CM

## 2024-06-12 PROCEDURE — 99213 OFFICE O/P EST LOW 20 MIN: CPT

## 2024-06-12 NOTE — PLAN
[FreeTextEntry1] : Patient has resolving shingles on left scalp and face has seen dermatologist was not given Valtrex at that time Over the next several months patient will think about getting the shingles vaccination Patient is going for virtual colonoscopy tomorrow and will have the results sent to me as well

## 2024-06-12 NOTE — HISTORY OF PRESENT ILLNESS
[FreeTextEntry8] : Patient presents the office today to update me she is going for virtual colonoscopy tomorrow as she was not able to complete colonoscopy as they were not able to fully visualize the colon secondary to not being able to advance the camera About 3 weeks ago patient did get diagnosed with having shingles on the scalp and left forehead that has now since resolved

## 2024-06-12 NOTE — PHYSICAL EXAM
[Normal] : no acute distress, well nourished, well developed and well-appearing Patient/Caregiver provided printed discharge information.

## 2024-06-13 ENCOUNTER — APPOINTMENT (OUTPATIENT)
Dept: CT IMAGING | Facility: CLINIC | Age: 69
End: 2024-06-13
Payer: MEDICARE

## 2024-06-13 ENCOUNTER — OUTPATIENT (OUTPATIENT)
Dept: OUTPATIENT SERVICES | Facility: HOSPITAL | Age: 69
LOS: 1 days | End: 2024-06-13
Payer: MEDICARE

## 2024-06-13 DIAGNOSIS — Z00.8 ENCOUNTER FOR OTHER GENERAL EXAMINATION: ICD-10-CM

## 2024-06-13 PROCEDURE — 74261 CT COLONOGRAPHY DX: CPT

## 2024-06-13 PROCEDURE — 74261 CT COLONOGRAPHY DX: CPT | Mod: 26,MH

## 2024-06-18 ENCOUNTER — NON-APPOINTMENT (OUTPATIENT)
Age: 69
End: 2024-06-18

## 2024-06-19 ENCOUNTER — NON-APPOINTMENT (OUTPATIENT)
Age: 69
End: 2024-06-19

## 2024-07-10 ENCOUNTER — RX RENEWAL (OUTPATIENT)
Age: 69
End: 2024-07-10

## 2024-07-15 ENCOUNTER — TRANSCRIPTION ENCOUNTER (OUTPATIENT)
Age: 69
End: 2024-07-15

## 2024-07-15 DIAGNOSIS — T75.3XXA MOTION SICKNESS, INITIAL ENCOUNTER: ICD-10-CM

## 2024-08-07 ENCOUNTER — APPOINTMENT (OUTPATIENT)
Dept: INTERNAL MEDICINE | Facility: CLINIC | Age: 69
End: 2024-08-07

## 2024-08-07 PROBLEM — J01.90 ACUTE NON-RECURRENT SINUSITIS, UNSPECIFIED LOCATION: Status: ACTIVE | Noted: 2024-08-07 | Resolved: 2024-09-06

## 2024-08-07 PROCEDURE — 99213 OFFICE O/P EST LOW 20 MIN: CPT

## 2024-08-07 NOTE — HISTORY OF PRESENT ILLNESS
[FreeTextEntry8] : Patient presents to the office today with 2 days of sinus pressure and pain no fevers no chills Patient does feel better today pressure and pain is improved However patient is going away on a trip to [place] for 10 days

## 2024-08-07 NOTE — PLAN
[FreeTextEntry1] : Pt appears to have a resolved sinusitis.  Since going away, she will bring Amoxicillin with her if her symptoms return or if develops a fever.

## 2024-08-19 ENCOUNTER — TRANSCRIPTION ENCOUNTER (OUTPATIENT)
Age: 69
End: 2024-08-19

## 2024-08-20 ENCOUNTER — TRANSCRIPTION ENCOUNTER (OUTPATIENT)
Age: 69
End: 2024-08-20

## 2024-10-10 ENCOUNTER — APPOINTMENT (OUTPATIENT)
Dept: INTERNAL MEDICINE | Facility: CLINIC | Age: 69
End: 2024-10-10
Payer: MEDICARE

## 2024-10-10 VITALS
HEART RATE: 84 BPM | BODY MASS INDEX: 26.8 KG/M2 | OXYGEN SATURATION: 98 % | SYSTOLIC BLOOD PRESSURE: 102 MMHG | WEIGHT: 157 LBS | RESPIRATION RATE: 12 BRPM | TEMPERATURE: 98.1 F | DIASTOLIC BLOOD PRESSURE: 62 MMHG | HEIGHT: 64 IN

## 2024-10-10 DIAGNOSIS — R11.2 NAUSEA WITH VOMITING, UNSPECIFIED: ICD-10-CM

## 2024-10-10 DIAGNOSIS — R10.11 RIGHT UPPER QUADRANT PAIN: ICD-10-CM

## 2024-10-10 PROCEDURE — 99214 OFFICE O/P EST MOD 30 MIN: CPT

## 2024-10-10 PROCEDURE — G2211 COMPLEX E/M VISIT ADD ON: CPT

## 2024-10-18 ENCOUNTER — TRANSCRIPTION ENCOUNTER (OUTPATIENT)
Age: 69
End: 2024-10-18

## 2024-10-30 ENCOUNTER — NON-APPOINTMENT (OUTPATIENT)
Age: 69
End: 2024-10-30

## 2024-11-07 ENCOUNTER — TRANSCRIPTION ENCOUNTER (OUTPATIENT)
Age: 69
End: 2024-11-07

## 2024-11-11 ENCOUNTER — APPOINTMENT (OUTPATIENT)
Dept: INTERNAL MEDICINE | Facility: CLINIC | Age: 69
End: 2024-11-11

## 2024-12-16 ENCOUNTER — RX RENEWAL (OUTPATIENT)
Age: 69
End: 2024-12-16

## 2025-03-12 DIAGNOSIS — Z12.31 ENCOUNTER FOR SCREENING MAMMOGRAM FOR MALIGNANT NEOPLASM OF BREAST: ICD-10-CM

## 2025-03-13 ENCOUNTER — TRANSCRIPTION ENCOUNTER (OUTPATIENT)
Age: 70
End: 2025-03-13

## 2025-03-21 ENCOUNTER — APPOINTMENT (OUTPATIENT)
Dept: INTERNAL MEDICINE | Facility: CLINIC | Age: 70
End: 2025-03-21
Payer: MEDICARE

## 2025-03-21 VITALS
HEIGHT: 64 IN | SYSTOLIC BLOOD PRESSURE: 112 MMHG | OXYGEN SATURATION: 98 % | TEMPERATURE: 98.2 F | HEART RATE: 90 BPM | DIASTOLIC BLOOD PRESSURE: 70 MMHG | RESPIRATION RATE: 12 BRPM | BODY MASS INDEX: 28.85 KG/M2 | WEIGHT: 169 LBS

## 2025-03-21 DIAGNOSIS — J01.00 ACUTE MAXILLARY SINUSITIS, UNSPECIFIED: ICD-10-CM

## 2025-03-21 DIAGNOSIS — R09.89 OTHER SPECIFIED SYMPTOMS AND SIGNS INVOLVING THE CIRCULATORY AND RESPIRATORY SYSTEMS: ICD-10-CM

## 2025-03-21 PROCEDURE — 99213 OFFICE O/P EST LOW 20 MIN: CPT

## 2025-03-21 RX ORDER — CEFDINIR 300 MG/1
300 CAPSULE ORAL
Qty: 20 | Refills: 0 | Status: ACTIVE | COMMUNITY
Start: 2025-03-21 | End: 1900-01-01

## 2025-03-26 ENCOUNTER — TRANSCRIPTION ENCOUNTER (OUTPATIENT)
Age: 70
End: 2025-03-26

## 2025-03-27 ENCOUNTER — APPOINTMENT (OUTPATIENT)
Dept: RADIOLOGY | Facility: CLINIC | Age: 70
End: 2025-03-27
Payer: MEDICARE

## 2025-03-27 ENCOUNTER — TRANSCRIPTION ENCOUNTER (OUTPATIENT)
Age: 70
End: 2025-03-27

## 2025-03-27 PROCEDURE — 71046 X-RAY EXAM CHEST 2 VIEWS: CPT

## 2025-03-28 ENCOUNTER — TRANSCRIPTION ENCOUNTER (OUTPATIENT)
Age: 70
End: 2025-03-28

## 2025-03-28 RX ORDER — FLUTICASONE PROPIONATE AND SALMETEROL 250; 50 UG/1; UG/1
250-50 POWDER RESPIRATORY (INHALATION)
Qty: 60 | Refills: 0 | Status: ACTIVE | COMMUNITY
Start: 2025-03-28 | End: 1900-01-01

## 2025-03-31 ENCOUNTER — TRANSCRIPTION ENCOUNTER (OUTPATIENT)
Age: 70
End: 2025-03-31

## 2025-03-31 RX ORDER — PREDNISONE 20 MG/1
20 TABLET ORAL
Qty: 5 | Refills: 0 | Status: ACTIVE | COMMUNITY
Start: 2025-03-31 | End: 1900-01-01

## 2025-04-01 ENCOUNTER — TRANSCRIPTION ENCOUNTER (OUTPATIENT)
Age: 70
End: 2025-04-01

## 2025-04-01 PROBLEM — R05.9 COUGH: Status: ACTIVE | Noted: 2025-03-31

## 2025-04-01 RX ORDER — PROMETHAZINE HYDROCHLORIDE AND DEXTROMETHORPHAN HYDROBROMIDE ORAL SOLUTION 15; 6.25 MG/5ML; MG/5ML
6.25-15 SOLUTION ORAL
Qty: 1 | Refills: 0 | Status: ACTIVE | COMMUNITY
Start: 2025-04-01 | End: 1900-01-01

## 2025-04-03 ENCOUNTER — TRANSCRIPTION ENCOUNTER (OUTPATIENT)
Age: 70
End: 2025-04-03

## 2025-04-07 ENCOUNTER — APPOINTMENT (OUTPATIENT)
Dept: INTERNAL MEDICINE | Facility: CLINIC | Age: 70
End: 2025-04-07
Payer: MEDICARE

## 2025-04-07 VITALS
TEMPERATURE: 98.4 F | DIASTOLIC BLOOD PRESSURE: 68 MMHG | HEIGHT: 64 IN | WEIGHT: 176 LBS | HEART RATE: 98 BPM | SYSTOLIC BLOOD PRESSURE: 100 MMHG | RESPIRATION RATE: 14 BRPM | BODY MASS INDEX: 30.05 KG/M2 | OXYGEN SATURATION: 94 %

## 2025-04-07 DIAGNOSIS — R05.9 COUGH, UNSPECIFIED: ICD-10-CM

## 2025-04-07 DIAGNOSIS — R09.89 OTHER SPECIFIED SYMPTOMS AND SIGNS INVOLVING THE CIRCULATORY AND RESPIRATORY SYSTEMS: ICD-10-CM

## 2025-04-07 PROCEDURE — 99214 OFFICE O/P EST MOD 30 MIN: CPT

## 2025-04-07 PROCEDURE — G2211 COMPLEX E/M VISIT ADD ON: CPT

## 2025-04-07 RX ORDER — BENZONATATE 200 MG/1
200 CAPSULE ORAL 3 TIMES DAILY
Qty: 30 | Refills: 0 | Status: ACTIVE | COMMUNITY
Start: 2025-04-07 | End: 1900-01-01

## 2025-04-07 RX ORDER — IPRATROPIUM BROMIDE AND ALBUTEROL SULFATE 2.5; .5 MG/3ML; MG/3ML
0.5-2.5 (3) SOLUTION RESPIRATORY (INHALATION)
Qty: 1 | Refills: 0 | Status: ACTIVE | COMMUNITY
Start: 2025-04-07 | End: 1900-01-01

## 2025-04-07 RX ORDER — LEVOCETIRIZINE DIHYDROCHLORIDE 5 MG/1
5 TABLET ORAL
Qty: 14 | Refills: 0 | Status: ACTIVE | COMMUNITY
Start: 2025-04-07 | End: 1900-01-01

## 2025-05-07 ENCOUNTER — TRANSCRIPTION ENCOUNTER (OUTPATIENT)
Age: 70
End: 2025-05-07

## 2025-05-09 ENCOUNTER — NON-APPOINTMENT (OUTPATIENT)
Age: 70
End: 2025-05-09

## 2025-05-09 ENCOUNTER — APPOINTMENT (OUTPATIENT)
Dept: INTERNAL MEDICINE | Facility: CLINIC | Age: 70
End: 2025-05-09
Payer: MEDICARE

## 2025-05-09 VITALS
HEIGHT: 64 IN | DIASTOLIC BLOOD PRESSURE: 80 MMHG | SYSTOLIC BLOOD PRESSURE: 110 MMHG | WEIGHT: 175 LBS | OXYGEN SATURATION: 97 % | BODY MASS INDEX: 29.88 KG/M2 | HEART RATE: 80 BPM

## 2025-05-09 DIAGNOSIS — R53.83 OTHER FATIGUE: ICD-10-CM

## 2025-05-09 DIAGNOSIS — E55.9 VITAMIN D DEFICIENCY, UNSPECIFIED: ICD-10-CM

## 2025-05-09 DIAGNOSIS — R09.81 NASAL CONGESTION: ICD-10-CM

## 2025-05-09 PROCEDURE — 36415 COLL VENOUS BLD VENIPUNCTURE: CPT

## 2025-05-09 PROCEDURE — 99214 OFFICE O/P EST MOD 30 MIN: CPT

## 2025-05-09 PROCEDURE — G2211 COMPLEX E/M VISIT ADD ON: CPT

## 2025-05-13 ENCOUNTER — TRANSCRIPTION ENCOUNTER (OUTPATIENT)
Age: 70
End: 2025-05-13

## 2025-05-13 LAB
25(OH)D3 SERPL-MCNC: 36.5 NG/ML
ALBUMIN SERPL ELPH-MCNC: 4 G/DL
ALP BLD-CCNC: 117 U/L
ALT SERPL-CCNC: 13 U/L
ANION GAP SERPL CALC-SCNC: 14 MMOL/L
AST SERPL-CCNC: 18 U/L
BASOPHILS # BLD AUTO: 0.05 K/UL
BASOPHILS NFR BLD AUTO: 0.7 %
BILIRUB SERPL-MCNC: 0.4 MG/DL
BUN SERPL-MCNC: 9 MG/DL
CALCIUM SERPL-MCNC: 8.4 MG/DL
CHLORIDE SERPL-SCNC: 103 MMOL/L
CO2 SERPL-SCNC: 22 MMOL/L
CREAT SERPL-MCNC: 0.6 MG/DL
EGFRCR SERPLBLD CKD-EPI 2021: 96 ML/MIN/1.73M2
EOSINOPHIL # BLD AUTO: 0.16 K/UL
EOSINOPHIL NFR BLD AUTO: 2.1 %
ESTIMATED AVERAGE GLUCOSE: 111 MG/DL
GLUCOSE SERPL-MCNC: 96 MG/DL
HBA1C MFR BLD HPLC: 5.5 %
HCT VFR BLD CALC: 42 %
HGB BLD-MCNC: 13.7 G/DL
IMM GRANULOCYTES NFR BLD AUTO: 0.3 %
LYMPHOCYTES # BLD AUTO: 1.39 K/UL
LYMPHOCYTES NFR BLD AUTO: 18.2 %
MAGNESIUM SERPL-MCNC: 2.2 MG/DL
MAN DIFF?: NORMAL
MCHC RBC-ENTMCNC: 29.9 PG
MCHC RBC-ENTMCNC: 32.6 G/DL
MCV RBC AUTO: 91.7 FL
MONOCYTES # BLD AUTO: 0.66 K/UL
MONOCYTES NFR BLD AUTO: 8.6 %
NEUTROPHILS # BLD AUTO: 5.36 K/UL
NEUTROPHILS NFR BLD AUTO: 70.1 %
PLATELET # BLD AUTO: 220 K/UL
POTASSIUM SERPL-SCNC: 4.5 MMOL/L
PROT SERPL-MCNC: 6.2 G/DL
RBC # BLD: 4.58 M/UL
RBC # FLD: 13.5 %
SODIUM SERPL-SCNC: 139 MMOL/L
T4 FREE SERPL-MCNC: 1.2 NG/DL
TSH SERPL-ACNC: 2.13 UIU/ML
VIT B12 SERPL-MCNC: >2000 PG/ML
WBC # FLD AUTO: 7.64 K/UL

## (undated) DEVICE — TUBING CANNULA SALTER LABS NASAL ADULT 7FT

## (undated) DEVICE — TUBING IV SET GRAVITY 3Y 100" MACRO

## (undated) DEVICE — CATH IV SAFE BC 20G X 1.16" (PINK)

## (undated) DEVICE — POLY TRAP ETRAP

## (undated) DEVICE — VALVE BIOPSY

## (undated) DEVICE — MASK O2 NON REBREATH 3IN1 ADULT

## (undated) DEVICE — TUBING ENDO EXT OLYMPUS 160 24HR USE

## (undated) DEVICE — Device

## (undated) DEVICE — RETRIEVER ROTH NET PLATINUM-UNIVERSAL

## (undated) DEVICE — SYR LUER SLIP TIP 50CC

## (undated) DEVICE — CANISTER SUCTION 1200CC 10/SL

## (undated) DEVICE — BITE BLOCK MAXI RUBBER STAMP

## (undated) DEVICE — SYR IV POSIFLUSH NS 3ML 30/TY

## (undated) DEVICE — SENSOR O2 FINGER XL ADULT 24/BX 6BX/CA

## (undated) DEVICE — SNARE CAPTIVATOR II RND COLD 10MM

## (undated) DEVICE — TUBING SUCTION CONN 6FT STERILE

## (undated) DEVICE — BRUSH COLONOSCOPY CYTOLOGY

## (undated) DEVICE — SUCTION YANKAUER TAPERED BULBOUS NO VENT

## (undated) DEVICE — SYR LUER SLIP TIP 30CC

## (undated) DEVICE — SNARE POLYP SENS 27MM 240CM

## (undated) DEVICE — FORMALIN CUPS 10% BUFFERED

## (undated) DEVICE — STERIS DEFENDO 3-PIECE KIT (AIR/WATER, SUCTION & BIOPSY VALVES)

## (undated) DEVICE — TUBE O2 SUPL CRUSH RESIS CONN SOUTHSIDE ONLY

## (undated) DEVICE — PACK IV START WITH CHG

## (undated) DEVICE — BRUSH CYTO ENDO

## (undated) DEVICE — FORCEP RADIAL JAW 4 JUMBO 2.8MM 3.2MM 240CM ORANGE DISP

## (undated) DEVICE — SUT HEWSON RETRIEVER

## (undated) DEVICE — FORCEP RADIAL JAW 4 W NDL 2.4MM 2.8MM 240CM ORANGE DISP

## (undated) DEVICE — MASK LRG MED AND HIGH O2 CONC M TO M 10FT

## (undated) DEVICE — TUBING IV SET SECOND 34" W/O LOK-BLUNT

## (undated) DEVICE — SOL IRR POUR H2O 500ML

## (undated) DEVICE — SYR ALLIANCE II INFLATION 60ML

## (undated) DEVICE — CATH IV SAFE BC 22G X 1" (BLUE)

## (undated) DEVICE — TRAP QUICK CATCH  SINGL CHAMBER

## (undated) DEVICE — CATH ELECHMSTAT  INJ 7FR 210CM

## (undated) DEVICE — NDL INJ SCLERO INTERJECT 23G

## (undated) DEVICE — DRSG CURITY GAUZE SPONGE 4 X 4" 12-PLY

## (undated) DEVICE — ELCTR GROUNDING PAD ADULT COVIDIEN

## (undated) DEVICE — TUBING IV SET SECONDARY 34"

## (undated) DEVICE — SNARE LRG

## (undated) DEVICE — ENDOCUFF VISION SZ 2 LG GRN

## (undated) DEVICE — FORCEP RADIAL JAW 4 W NDL 2.2MM 2.8MM 160CM YELLOW DISP

## (undated) DEVICE — TUBE RECTAL 24FR

## (undated) DEVICE — RADIAL JAW 4 LG CAPACITY WITH NDL

## (undated) DEVICE — MASK OXYGEN PANORAMIC

## (undated) DEVICE — MARKER ENDO SPOT EX

## (undated) DEVICE — ENDOCUFF VISION SZ 3 SM PRPL